# Patient Record
Sex: MALE | Race: WHITE | NOT HISPANIC OR LATINO | Employment: OTHER | URBAN - METROPOLITAN AREA
[De-identification: names, ages, dates, MRNs, and addresses within clinical notes are randomized per-mention and may not be internally consistent; named-entity substitution may affect disease eponyms.]

---

## 2017-02-22 ENCOUNTER — NON-PROVIDER VISIT (OUTPATIENT)
Dept: CARDIOLOGY | Facility: MEDICAL CENTER | Age: 82
End: 2017-02-22
Payer: MEDICARE

## 2017-02-22 DIAGNOSIS — I49.5 SSS (SICK SINUS SYNDROME) (HCC): Chronic | ICD-10-CM

## 2017-02-22 DIAGNOSIS — Z95.0 PRESENCE OF PERMANENT CARDIAC PACEMAKER: Chronic | ICD-10-CM

## 2017-02-22 PROCEDURE — 93280 PM DEVICE PROGR EVAL DUAL: CPT | Performed by: INTERNAL MEDICINE

## 2017-05-23 ENCOUNTER — PATIENT OUTREACH (OUTPATIENT)
Dept: HEALTH INFORMATION MANAGEMENT | Facility: OTHER | Age: 82
End: 2017-05-23

## 2017-05-23 NOTE — PROGRESS NOTES
Attempt #:1 -- APPOINTMENT SCHEDULED WITH PT'S SON. HE WILL NOTIFY SENIOR HELPERS OF THIS APPOINTMENT.    WebIZ Checked & Epic Updated: yes  HealthConnect Verified: no  Verify PCP: yes    Communication Preference Obtained: yes     Review Care Team: yes    Annual Wellness Visit Scheduling  1. Scheduling Status:Scheduled    Care Gap Scheduling      Health Maintenance Due   Topic Date Due   • Annual Wellness Visit  SCHEDULED   • IMM DTaP/Tdap/Td Vaccine (1 - Tdap) WILL DISCUSS WITH PCP   • COLONOSCOPY  WILL DISCUSS WITH PCP   • IMM ZOSTER VACCINE  WILL DISCUSS WITH PCP   • IMM PNEUMOCOCCAL 65+ (ADULT) LOW/MEDIUM RISK SERIES (1 of 2 - PCV13) WILL DISCUSS WITH PCP         MyChart Activation: NOT DISCUSSED WITH PT'S SON DURING THIS PHONE CALL.  [employer] Tiff: no  Virtual Visits: no  Opt In to Text Messages: no

## 2017-06-12 ENCOUNTER — OFFICE VISIT (OUTPATIENT)
Dept: MEDICAL GROUP | Facility: MEDICAL CENTER | Age: 82
End: 2017-06-12
Payer: MEDICARE

## 2017-06-12 VITALS
HEIGHT: 66 IN | WEIGHT: 159.83 LBS | TEMPERATURE: 98 F | OXYGEN SATURATION: 97 % | SYSTOLIC BLOOD PRESSURE: 128 MMHG | DIASTOLIC BLOOD PRESSURE: 72 MMHG | BODY MASS INDEX: 25.69 KG/M2 | HEART RATE: 72 BPM

## 2017-06-12 DIAGNOSIS — K21.9 GASTROESOPHAGEAL REFLUX DISEASE WITHOUT ESOPHAGITIS: Chronic | ICD-10-CM

## 2017-06-12 DIAGNOSIS — Z85.048 HISTORY OF RECTAL CANCER: Chronic | ICD-10-CM

## 2017-06-12 DIAGNOSIS — E05.00 GRAVES' DISEASE: Chronic | ICD-10-CM

## 2017-06-12 DIAGNOSIS — I49.5 SSS (SICK SINUS SYNDROME) (HCC): Chronic | ICD-10-CM

## 2017-06-12 DIAGNOSIS — I10 BENIGN ESSENTIAL HYPERTENSION: Chronic | ICD-10-CM

## 2017-06-12 DIAGNOSIS — Z00.00 MEDICARE ANNUAL WELLNESS VISIT, INITIAL: Primary | ICD-10-CM

## 2017-06-12 DIAGNOSIS — Z95.0 PRESENCE OF PERMANENT CARDIAC PACEMAKER: Chronic | ICD-10-CM

## 2017-06-12 PROCEDURE — G0438 PPPS, INITIAL VISIT: HCPCS | Performed by: NURSE PRACTITIONER

## 2017-06-12 RX ORDER — OMEPRAZOLE 20 MG/1
20 CAPSULE, DELAYED RELEASE ORAL DAILY
Status: ON HOLD | COMMUNITY
End: 2018-03-03

## 2017-06-12 ASSESSMENT — PATIENT HEALTH QUESTIONNAIRE - PHQ9: CLINICAL INTERPRETATION OF PHQ2 SCORE: 0

## 2017-06-12 NOTE — ASSESSMENT & PLAN NOTE
Chronic condition managed with current medical regimen  Stable per review   Continue with current meds  Followed by Sandra Kwon M.D..

## 2017-06-12 NOTE — PROGRESS NOTES
CC:   Medicare Annual Wellness Visit    HPI:  Richard is a 89 y.o. here for Medicare Annual Wellness Visit    Patient Active Problem List    Diagnosis Date Noted   • Aortic stenosis 05/07/2013     Priority: High   • Benign essential hypertension 02/13/2012     Priority: High   • Presence of permanent cardiac pacemaker 02/13/2012     Priority: High   • SSS (sick sinus syndrome) (CMS-HCC) 02/13/2012     Priority: High   • Hypothyroidism 03/27/2014     Priority: Medium   • Esophageal reflux 02/13/2012     Priority: Medium   • History of Graves' disease 02/13/2012     Priority: Medium   • History of rectal cancer 02/13/2012     Priority: Medium   • Benign prostatic hypertrophy 02/13/2012     Priority: Low     Current Outpatient Prescriptions   Medication Sig Dispense Refill   • omeprazole (PRILOSEC) 20 MG delayed-release capsule Take 20 mg by mouth every day.     • losartan-hydrochlorothiazide (HYZAAR) 100-12.5 MG per tablet Take 1 Tab by mouth every day.     • finasteride (PROSCAR) 5 MG TABS Take 1 Tab by mouth every day.     • allopurinol (ZYLOPRIM) 300 MG TABS Take 1 Tab by mouth every day.     • Cholecalciferol (RA VITAMIN D-3) 5000 UNIT CAPS Take  by mouth.     • aspirin (ASA) 81 MG CHEW Take 1 Tab by mouth every day. 30 Each 11   • levothyroxine (SYNTHROID) 175 MCG TABS Take 175 mcg by mouth every day.       No current facility-administered medications for this visit.      Current supplements: no  Chronic narcotic pain medicines: no  Allergies: Penicillins  Exercise: as chelly,   Current social contact/activities: lives alone, son lives in [place], caregiver daily, caregiver here with Richard    Screening:  Depression Screening    Little interest or pleasure in doing things?  0 - not at all  Feeling down, depressed , or hopeless? 0 - not at all  Trouble falling or staying asleep, or sleeping too much?     Feeling tired or having little energy?     Poor appetite or overeating?     Feeling bad about yourself - or that you are  a failure or have let yourself or your family down?    Trouble concentrating on things, such as reading the newspaper or watching television?    Moving or speaking so slowly that other people could have noticed.  Or the opposite - being so fidgety or restless that you have been moving around a lot more than usual?     Thoughts that you would be better off dead, or of hurting yourself?     Patient Health Questionnaire Score:    If depressive symptoms identified deferred to follow up visit unless specifically addressed in assessment and plan.    Interpretation of PHQ-9 Total Score   Score Severity   1-4 Minimal Depression   5-9 Mild Depression   10-14 Moderate Depression   15-19 Moderately Severe Depression   20-27 Severe Depression    Screening for Cognitive Impairment    Three Minute Recall (banana, sunrise, fence)  1/3    Draw clock face with all 12 numbers set to the hand to show 10 minures past 11 o'clock  1    If cognitive concerns identified deferred to follow up visit unless specifically addressed in assessment and plan.    Fall Risk Assessment    Has the patient had two or more falls in the last year or any fall with injury in the last year?  No  If Fall Risk identified deferred to follow up visit unless specifically addressed in assessment and plan.    Safety Assessment    Throw rugs on floor.  No  Handrails on all stairs.  Yes  Good lighting in all hallways.  Yes  Difficulty hearing.  Yes  Patient counseled about all safety risks that were identified.    Functional Assessment ADLs    Are there any barriers preventing you from cooking for yourself or meeting nutritional needs?  Yes.    Are there any barriers preventing you from driving safely or obtaining transportation?  Yes.    Are there any barriers preventing you from using a telephone or calling for help?  No.    Are there any barriers preventing you from shopping?  Yes.    Are there any barriers preventing you from taking care of your own finances?   "Yes.    Are there any barriers preventing you from managing your medications?  Yes.    Are currently engaing any exercise or physical activity?  No.       Health Maintenance Summary                Annual Wellness Visit Overdue 6/17/1921     IMM DTaP/Tdap/Td Vaccine Overdue 6/17/1940     IMM ZOSTER VACCINE Overdue 6/17/1981     IMM PNEUMOCOCCAL 65+ (ADULT) LOW/MEDIUM RISK SERIES Overdue 6/17/1986           Patient Care Team:  Sandra Kwon M.D. as PCP - General  Kathi Bush M.D. as Consulting Physician (Cardiology)        Social History   Substance Use Topics   • Smoking status: Former Smoker -- 20 years     Types: Cigarettes     Quit date: 11/20/1982   • Smokeless tobacco: Never Used   • Alcohol Use: Yes      Comment: occas scotch       Family History   Problem Relation Age of Onset   • Heart Disease Mother      Reported Family Hx of Ischemic Heart Disease   • Heart Disease Father      Reported Family Hx of Ischemic Heart Disease     He  has a past medical history of Benign essential hypertension ( ); Benign prostatic hypertrophy ( ); Presence of permanent cardiac pacemaker ( ); Esophageal reflux ( ); SSS (sick sinus syndrome) (CMS-[facility]) (July 2009); Grave's disease ( ); History of rectal cancer ( ); Aortic stenosis; and Hypothyroidism. He also has no past medical history of Encounter for long-term (current) use of other medications.   Past Surgical History   Procedure Laterality Date   • Other abdominal surgery       Colostomy   • Pacemaker insertion  July 2009     [employer] Altrua 60 implanted by Dr. Griffin.       ROS:    Ostomy or other tubes or amputations: yes, colostomy  Chronic oxygen use no, prn use at home  Last eye exam 2016  : Denies incontinence.   Gait: Uses a cane   Hearing good.    Dentition good    Exam: Blood pressure 128/72, pulse 72, temperature 36.7 °C (98 °F), height 1.676 m (5' 6\"), weight 72.5 kg (159 lb 13.3 oz), SpO2 97 %. Body mass index is 25.81 kg/(m^2).  Alert, " oriented in no acute distress.  Eye contact is good, speech goal directed, affect calm      Assessment and Plan. The following treatment and monitoring plan is recommended for all problems as listed below:   Aortic stenosis  Followed by cardiology q 6 months  Denies cardiac sxs  Continue with current medications     Benign essential hypertension  Chronic condition managed with current medical regimen  Stable per review   Continue with current meds  Followed by Sandra Kwon M.D..        Benign prostatic hypertrophy  Chronic condition managed with current medical regimen  Stable per review   Continue with current meds  Followed by Sandra Kwon M.D..        Esophageal reflux  Chronic condition managed with current medical regimen  Stable per review   Continue with current meds  Followed by Sandra Kwon M.D..        History of Graves' disease  Chronic condition managed with current medical regimen  Stable per review   Continue with current meds  Followed by Sandra Kwon M.D..        History of rectal cancer  Per pt and caregiver has a colostomy since dx of rectal cancer  To best of his recollection, no chemo or radiation   Followed by Sandra Kwon M.D..     Hypothyroidism  Chronic condition managed with current medical regimen  Stable per review   Continue with current meds  Followed by Sandra Kwon M.D..        Presence of permanent cardiac pacemaker  Followed by cardiology q 6 months  Denies cardiac sxs  Continue with current medications     SSS (sick sinus syndrome)  Pacemaker in place  Followed by cardiology        Health Care Screening recommendations reviewed with patient today: per Patient Instructions  DPA/Advanced directive: .unknown    Next office visit for recheck of chronic medical conditions is due with Sandra Kwon M.D. in 6 months

## 2017-06-12 NOTE — ASSESSMENT & PLAN NOTE
Per pt and caregiver has a colostomy since dx of rectal cancer  To best of his recollection, no chemo or radiation   Followed by Sandra Kwon M.D..

## 2017-06-12 NOTE — PATIENT INSTRUCTIONS
Continue with care through Sandra Kwon M.D..  Next Medicare Annual Wellness Visit is due in one year.    Continue care with specialists you are seeing for your chronic problems.    Attached is some preventative information for you to read.          Fall Prevention and Home Safety  Falls cause injuries and can affect all age groups. It is possible to prevent falls.   HOW TO PREVENT FALLS  · Wear shoes with rubber soles that do not have an opening for your toes.   · Keep the inside and outside of your house well lit.   · Use night lights throughout your home.   · Remove clutter from floors.   · Clean up floor spills.   · Remove throw rugs or fasten them to the floor with carpet tape.   · Do not place electrical cords across pathways.   · Put grab bars by your tub, shower, and toilet. Do not use towel bars as grab bars.   · Put handrails on both sides of the stairway. Fix loose handrails.   · Do not climb on stools or stepladders, if possible.   · Do not wax your floors.   · Repair uneven or unsafe sidewalks, walkways, or stairs.   · Keep items you use a lot within reach.   · Be aware of pets.   · Keep emergency numbers next to the telephone.   · Put smoke detectors in your home and near bedrooms.   Ask your doctor what other things you can do to prevent falls.  Document Released: 10/14/2010 Document Revised: 06/18/2013 Document Reviewed: 03/19/2013  ExitCare® Patient Information ©2013 "[employer].    Exercise to Stay Healthy      Exercise helps you become and stay healthy.  EXERCISE IDEAS AND TIPS  Choose exercises that:  · You enjoy.   · Fit into your day.   You do not need to exercise really hard to be healthy. You can do exercises at a slow or medium level and stay healthy. You can:  · Stretch before and after working out.   · Try yoga, Pilates, or sha chi.   · Lift weights.   · Walk fast, swim, jog, run, climb stairs, bicycle, dance, or rollerskate.   · Take aerobic classes.   Exercises that burn  about 150 calories:  · Running 1 ½ miles in 15 minutes.   · Playing volleyball for 45 to 60 minutes.   · Washing and waxing a car for 45 to 60 minutes.   · Playing touch football for 45 minutes.   · Walking 1 ¾ miles in 35 minutes.   · Pushing a stroller 1 ½ miles in 30 minutes.   · Playing basketball for 30 minutes.   · Raking leaves for 30 minutes.   · Bicycling 5 miles in 30 minutes.   · Walking 2 miles in 30 minutes.   · Dancing for 30 minutes.   · Shoveling snow for 15 minutes.   · Swimming laps for 20 minutes.   · Walking up stairs for 15 minutes.   · Bicycling 4 miles in 15 minutes.   · Gardening for 30 to 45 minutes.   · Jumping rope for 15 minutes.   · Washing windows or floors for 45 to 60 minutes.     One suggestion is to start walking for 10 minutes after each meal.  This will help with digestion and help you to metabolize your meal.       For Weight Loss -   Recommend portion sizes with more fruits/vegetables/high fiber foods.  Stay away from white bread/pastas/white rice/white potatoes.  Increase Fluid intake to 6-8 glasses of water daily.   Eliminate soda's (diet and regular) from your fluid intake.      Document Released: 01/20/2012 Document Revised: 03/11/2013 Document Reviewed: 01/20/2012  ExitCare® Patient Information ©2013 [employer]    Fat and Cholesterol Control Diet  Cholesterol is a wax-like substance. It comes from your liver and is found in certain foods. There is good (HDL) and bad (LDL) cholesterol. Too much cholesterol in your blood can affect your heart. Certain foods can lower or raise your cholesterol. Eat foods that are low in cholesterol.  Saturated and trans fats are bad fats found in foods that will raise your cholesterol. Do not eat foods that are high in saturated and trans fats.  FOODS HIGHER IN SATURATED AND TRANS FATS  · Dairy products, such as whole milk, eggs, cheese, cream, and butter.   · Fatty meats, such as hot dogs, sausage, and salami.   · Fried foods.   · Trans  fats which are found in margarine and pre-made cookies, crackers, and baked goods.   · Tropical oils, such as coconut and palm oils.   Read package labels at the store. Do not buy products that use saturated or trans fats or hydrogenated oils. Find foods labeled:  · Low-fat.   · Low-saturated fat.   · Trans-fat-free.   · Low-cholesterol.   FOODS LOWER IN CHOLESTEROL  ·  Fruit.   · Vegetables.   · Beans, peas, and lentils.   · Fish.   · Lean meat, such as chicken (without skin) or ground turkey.   · Grains, such as barley, rice, couscous, bulgur wheat, and pasta.   · Heart-healthy tub margarine.   PREPARING YOUR FOOD  · Broil, bake, steam, or roast foods. Do not austin food.   · Use non-stick cooking sprays.   · Use lemon or herbs to flavor food instead of using butter or stick margarine.   · Use nonfat yogurt, salsa, or low-fat dressings for salads.   Document Released: 06/18/2013 Document Reviewed: 06/18/2013  ExitCare® Patient Information ©2013 [employer]    Recommend annual flu vaccine.  Next due in Fall, 2015.  If you decide not to have the flu vaccine, recommend good handwashing and staying out of crowds during flu season.

## 2017-06-12 NOTE — PROGRESS NOTES
"Per caregiver present, received both the flu shot and \" a pneumonia shot\" in the fall of 2016.  Will bring record into the next Sandra Kwon M.D. appt for documentation purposes.  "

## 2018-01-23 ENCOUNTER — NON-PROVIDER VISIT (OUTPATIENT)
Dept: CARDIOLOGY | Facility: MEDICAL CENTER | Age: 83
End: 2018-01-23
Payer: MEDICARE

## 2018-01-23 ENCOUNTER — OFFICE VISIT (OUTPATIENT)
Dept: CARDIOLOGY | Facility: MEDICAL CENTER | Age: 83
End: 2018-01-23
Payer: MEDICARE

## 2018-01-23 VITALS
HEART RATE: 68 BPM | DIASTOLIC BLOOD PRESSURE: 80 MMHG | WEIGHT: 160 LBS | SYSTOLIC BLOOD PRESSURE: 140 MMHG | BODY MASS INDEX: 25.82 KG/M2 | OXYGEN SATURATION: 98 %

## 2018-01-23 DIAGNOSIS — Z95.0 PRESENCE OF PERMANENT CARDIAC PACEMAKER: Chronic | ICD-10-CM

## 2018-01-23 DIAGNOSIS — I35.0 NONRHEUMATIC AORTIC VALVE STENOSIS: ICD-10-CM

## 2018-01-23 DIAGNOSIS — I10 BENIGN ESSENTIAL HYPERTENSION: Chronic | ICD-10-CM

## 2018-01-23 PROCEDURE — 93280 PM DEVICE PROGR EVAL DUAL: CPT | Performed by: INTERNAL MEDICINE

## 2018-01-23 PROCEDURE — 99214 OFFICE O/P EST MOD 30 MIN: CPT | Performed by: INTERNAL MEDICINE

## 2018-01-23 NOTE — LETTER
[facility]   [address]  [place] [address]  Phone: [phone]  Fax: [phone]              Richard Callahan  6/17/1921    Encounter Date: 1/23/2018    Kathi Bush M.D.          PROGRESS NOTE:  Subjective:   Richard Callahan is a 89 y.o. male who presents today in follow-up regards to his aortic stenosis and pacemaker for sick sinus syndrome    He is with his caregiver as usual, they are doing very well  No hospital stays  Quality-of-life is excellent offers no complaints  As usually uses a cane, no falling    Past Medical History:   Diagnosis Date   • Aortic stenosis    • Benign essential hypertension     • Benign prostatic hypertrophy     • Esophageal reflux     • Grave's disease     • History of rectal cancer     • Hypothyroidism    • Presence of permanent cardiac pacemaker     • SSS (sick sinus syndrome) (CMS-[facility]) July 2009    Status post PPM implantation.     Past Surgical History:   Procedure Laterality Date   • PACEMAKER INSERTION  July 2009    [employer] Altrua 60 implanted by Dr. Griffin.   • OTHER ABDOMINAL SURGERY      Colostomy     Family History   Problem Relation Age of Onset   • Heart Disease Mother      Reported Family Hx of Ischemic Heart Disease   • Heart Disease Father      Reported Family Hx of Ischemic Heart Disease     History   Smoking Status   • Former Smoker   • Years: 20.00   • Types: Cigarettes   • Quit date: 11/20/1982   Smokeless Tobacco   • Never Used     Allergies   Allergen Reactions   • Penicillins      Outpatient Encounter Prescriptions as of 1/23/2018   Medication Sig Dispense Refill   • omeprazole (PRILOSEC) 20 MG delayed-release capsule Take 20 mg by mouth every day.     • losartan-hydrochlorothiazide (HYZAAR) 100-12.5 MG per tablet Take 1 Tab by mouth every day.     • finasteride (PROSCAR) 5 MG TABS Take 1 Tab by mouth every day.     • allopurinol (ZYLOPRIM) 300 MG TABS Take 1 Tab by mouth  every day.     • Cholecalciferol (RA VITAMIN D-3) 5000 UNIT CAPS Take  by mouth.     • aspirin (ASA) 81 MG CHEW Take 1 Tab by mouth every day. 30 Each 11   • levothyroxine (SYNTHROID) 175 MCG TABS Take 175 mcg by mouth every day.       No facility-administered encounter medications on file as of 1/23/2018.      Review of Systems   Unable to perform ROS: mental acuity        Objective:   /80   Pulse 68   Wt 72.6 kg (160 lb)   SpO2 98%   BMI 25.82 kg/m²      Physical Exam   Constitutional: He is oriented to person, place, and time. He appears well-developed.   Younger than stated age   HENT:   Head: Normocephalic and atraumatic.   Mouth/Throat: Mucous membranes are normal.   Eyes: Conjunctivae and EOM are normal.   Neck: No JVD present. No thyroid mass present.   Cardiovascular: Normal rate, regular rhythm and intact distal pulses.    Murmur (2/6 systolic ejection) heard.  Pacemaker in left chest   Pulmonary/Chest: Effort normal and breath sounds normal. He exhibits no tenderness.   Abdominal: Bowel sounds are normal.   Musculoskeletal: Normal range of motion. He exhibits no edema or tenderness.   Neurological: He is alert and oriented to person, place, and time. He has normal strength. He displays no tremor.   Skin: Skin is warm and dry. No rash noted.   Psychiatric: He has a normal mood and affect. His behavior is normal.   Vitals reviewed.      Assessment:     1. Nonrheumatic aortic valve stenosis     2. Presence of permanent cardiac pacemaker     3. Benign essential hypertension         Medical Decision Making:  Today's Assessment / Status / Plan:     Pacemaker for sick sinus syndrome   Pacemaker reviewed normal function and no changes made   He does follow-up twice every year for his pacer.   He does not have to see me with each visit especially if he is doing so well.   His memory is quite poor and his functionality is good although he is getting more breathless. Risk for falling as high, even if  fibrillation where not planning on systemic and coagulation    End-of-life care - aortic stenosis  As always, reassurance   His aortic stenosis murmur has increased, he is DNR and we will keep him palliative care as best we can for the rest of his life    RTC one year to see me and 6 months for his pacer.       Sandra Kwon M.D.  [address] [place] [id number]  VIA Facsimile: [phone]

## 2018-01-23 NOTE — PROGRESS NOTES
Subjective:   Richard Callahan is a 89 y.o. male who presents today in follow-up regards to his aortic stenosis and pacemaker for sick sinus syndrome    He is with his caregiver as usual, they are doing very well  No hospital stays  Quality-of-life is excellent offers no complaints  As usually uses a cane, no falling    Past Medical History:   Diagnosis Date   • Aortic stenosis    • Benign essential hypertension     • Benign prostatic hypertrophy     • Esophageal reflux     • Grave's disease     • History of rectal cancer     • Hypothyroidism    • Presence of permanent cardiac pacemaker     • SSS (sick sinus syndrome) (CMS-HCC) July 2009    Status post PPM implantation.     Past Surgical History:   Procedure Laterality Date   • PACEMAKER INSERTION  July 2009    [employer] Altrua 60 implanted by Dr. Griffin.   • OTHER ABDOMINAL SURGERY      Colostomy     Family History   Problem Relation Age of Onset   • Heart Disease Mother      Reported Family Hx of Ischemic Heart Disease   • Heart Disease Father      Reported Family Hx of Ischemic Heart Disease     History   Smoking Status   • Former Smoker   • Years: 20.00   • Types: Cigarettes   • Quit date: 11/20/1982   Smokeless Tobacco   • Never Used     Allergies   Allergen Reactions   • Penicillins      Outpatient Encounter Prescriptions as of 1/23/2018   Medication Sig Dispense Refill   • omeprazole (PRILOSEC) 20 MG delayed-release capsule Take 20 mg by mouth every day.     • losartan-hydrochlorothiazide (HYZAAR) 100-12.5 MG per tablet Take 1 Tab by mouth every day.     • finasteride (PROSCAR) 5 MG TABS Take 1 Tab by mouth every day.     • allopurinol (ZYLOPRIM) 300 MG TABS Take 1 Tab by mouth every day.     • Cholecalciferol (RA VITAMIN D-3) 5000 UNIT CAPS Take  by mouth.     • aspirin (ASA) 81 MG CHEW Take 1 Tab by mouth every day. 30 Each 11   • levothyroxine (SYNTHROID) 175 MCG TABS Take 175 mcg by mouth every day.       No facility-administered encounter  medications on file as of 1/23/2018.      Review of Systems   Unable to perform ROS: mental acuity        Objective:   /80   Pulse 68   Wt 72.6 kg (160 lb)   SpO2 98%   BMI 25.82 kg/m²     Physical Exam   Constitutional: He is oriented to person, place, and time. He appears well-developed.   Younger than stated age   HENT:   Head: Normocephalic and atraumatic.   Mouth/Throat: Mucous membranes are normal.   Eyes: Conjunctivae and EOM are normal.   Neck: No JVD present. No thyroid mass present.   Cardiovascular: Normal rate, regular rhythm and intact distal pulses.    Murmur (2/6 systolic ejection) heard.  Pacemaker in left chest   Pulmonary/Chest: Effort normal and breath sounds normal. He exhibits no tenderness.   Abdominal: Bowel sounds are normal.   Musculoskeletal: Normal range of motion. He exhibits no edema or tenderness.   Neurological: He is alert and oriented to person, place, and time. He has normal strength. He displays no tremor.   Skin: Skin is warm and dry. No rash noted.   Psychiatric: He has a normal mood and affect. His behavior is normal.   Vitals reviewed.      Assessment:     1. Nonrheumatic aortic valve stenosis     2. Presence of permanent cardiac pacemaker     3. Benign essential hypertension         Medical Decision Making:  Today's Assessment / Status / Plan:     Pacemaker for sick sinus syndrome   Pacemaker reviewed normal function and no changes made   He does follow-up twice every year for his pacer.   He does not have to see me with each visit especially if he is doing so well.   His memory is quite poor and his functionality is good although he is getting more breathless. Risk for falling as high, even if fibrillation where not planning on systemic and coagulation    End-of-life care - aortic stenosis  As always, reassurance   His aortic stenosis murmur has increased, he is DNR and we will keep him palliative care as best we can for the rest of his life    RTC one year to see  me and 6 months for his pacer.

## 2018-02-19 ENCOUNTER — APPOINTMENT (OUTPATIENT)
Dept: RADIOLOGY | Facility: IMAGING CENTER | Age: 83
End: 2018-02-19
Attending: PHYSICIAN ASSISTANT
Payer: MEDICARE

## 2018-02-19 ENCOUNTER — OFFICE VISIT (OUTPATIENT)
Dept: URGENT CARE | Facility: CLINIC | Age: 83
End: 2018-02-19
Payer: MEDICARE

## 2018-02-19 VITALS
SYSTOLIC BLOOD PRESSURE: 138 MMHG | BODY MASS INDEX: 26.82 KG/M2 | WEIGHT: 161 LBS | OXYGEN SATURATION: 96 % | HEART RATE: 88 BPM | TEMPERATURE: 96.8 F | HEIGHT: 65 IN | DIASTOLIC BLOOD PRESSURE: 80 MMHG

## 2018-02-19 DIAGNOSIS — J06.9 UPPER RESPIRATORY TRACT INFECTION, UNSPECIFIED TYPE: ICD-10-CM

## 2018-02-19 DIAGNOSIS — R05.9 COUGH: ICD-10-CM

## 2018-02-19 PROCEDURE — 71046 X-RAY EXAM CHEST 2 VIEWS: CPT | Mod: TC | Performed by: PHYSICIAN ASSISTANT

## 2018-02-19 PROCEDURE — 99214 OFFICE O/P EST MOD 30 MIN: CPT | Performed by: PHYSICIAN ASSISTANT

## 2018-02-19 RX ORDER — BENZONATATE 100 MG/1
200 CAPSULE ORAL 3 TIMES DAILY PRN
Qty: 30 CAP | Refills: 0 | Status: ON HOLD | OUTPATIENT
Start: 2018-02-19 | End: 2018-03-03

## 2018-02-19 ASSESSMENT — ENCOUNTER SYMPTOMS
EYE REDNESS: 0
EYE DISCHARGE: 0
NECK PAIN: 0
TINGLING: 0
RHINORRHEA: 1
DIZZINESS: 0
SORE THROAT: 0
FALLS: 0
MYALGIAS: 0
VOMITING: 0
SPUTUM PRODUCTION: 1
FEVER: 0
DIARRHEA: 0
CHILLS: 1
SHORTNESS OF BREATH: 0
WHEEZING: 0
COUGH: 1
HEADACHES: 0

## 2018-02-20 NOTE — PROGRESS NOTES
"Subjective:      Richard Callahan is a 89 y.o. male who presents with Cough (\"dry raspy cough,chest congestion\"x2days )            Patient is a 89-year-old male who presents today with cough, congestion for the last 2-3 days. Patient is with his caregiver today who provides the majority of the history. Patient's caregiver is concerned about pneumonia at this time as patient's cough was \"really bad\" earlier.      Cough   This is a new problem. Episode onset: 2-3 days ago. The problem has been unchanged. The problem occurs every few minutes. The cough is non-productive. Associated symptoms include chills, nasal congestion and rhinorrhea. Pertinent negatives include no ear pain, eye redness, fever, headaches, myalgias, rash, sore throat, shortness of breath or wheezing. Nothing aggravates the symptoms. He has tried nothing for the symptoms. The treatment provided mild relief.       Review of Systems   Constitutional: Positive for chills. Negative for fever and malaise/fatigue.   HENT: Positive for congestion and rhinorrhea. Negative for ear discharge, ear pain and sore throat.    Eyes: Negative for discharge and redness.   Respiratory: Positive for cough and sputum production. Negative for shortness of breath and wheezing.    Cardiovascular: Negative for leg swelling.   Gastrointestinal: Negative for diarrhea and vomiting.        Noted colostomy- without loose stool     Musculoskeletal: Negative for falls, myalgias and neck pain.   Skin: Negative for itching and rash.   Neurological: Negative for dizziness, tingling and headaches.   All other systems reviewed and are negative.         Objective:     /80   Pulse 88   Temp 36 °C (96.8 °F)   Ht 1.651 m (5' 5\")   Wt 73 kg (161 lb)   SpO2 96%   BMI 26.79 kg/m²    PMH:  has a past medical history of Aortic stenosis; Benign essential hypertension ( ); Benign prostatic hypertrophy ( ); Esophageal reflux ( ); Grave's disease ( ); History of rectal cancer ( ); " Hypothyroidism; Presence of permanent cardiac pacemaker ( ); and SSS (sick sinus syndrome) (CMS-HCC) (July 2009). He also has no past medical history of Encounter for long-term (current) use of other medications.  MEDS:   Current Outpatient Prescriptions:   •  benzonatate (TESSALON) 100 MG Cap, Take 2 Caps by mouth 3 times a day as needed for Cough., Disp: 30 Cap, Rfl: 0  •  omeprazole (PRILOSEC) 20 MG delayed-release capsule, Take 20 mg by mouth every day., Disp: , Rfl:   •  losartan-hydrochlorothiazide (HYZAAR) 100-12.5 MG per tablet, Take 1 Tab by mouth every day., Disp: , Rfl:   •  finasteride (PROSCAR) 5 MG TABS, Take 1 Tab by mouth every day., Disp: , Rfl:   •  allopurinol (ZYLOPRIM) 300 MG TABS, Take 1 Tab by mouth every day., Disp: , Rfl:   •  Cholecalciferol (RA VITAMIN D-3) 5000 UNIT CAPS, Take  by mouth., Disp: , Rfl:   •  aspirin (ASA) 81 MG CHEW, Take 1 Tab by mouth every day., Disp: 30 Each, Rfl: 11  •  levothyroxine (SYNTHROID) 175 MCG TABS, Take 175 mcg by mouth every day., Disp: , Rfl:   ALLERGIES:   Allergies   Allergen Reactions   • Penicillins      SURGHX:   Past Surgical History:   Procedure Laterality Date   • PACEMAKER INSERTION  July 2009    [employer] Altrua 60 implanted by Dr. Griffin.   • OTHER ABDOMINAL SURGERY      Colostomy     SOCHX:  reports that he quit smoking about 35 years ago. His smoking use included Cigarettes. He quit after 20.00 years of use. He has never used smokeless tobacco. He reports that he drinks alcohol. He reports that he does not use drugs.  FH: Family history was reviewed, no pertinent findings to report    Physical Exam   Constitutional: He is oriented to person, place, and time. He appears well-developed and well-nourished.   HENT:   Head: Normocephalic and atraumatic.   Mouth/Throat: No oropharyngeal exudate.   Ears- Canals clear- TM- with clear fluid effusions bilaterally.   Pos. PND, with slight erythema- without tonsillar edema or exudate.   Mild  discharge noted bilaterally- to nares.      Eyes: EOM are normal. Pupils are equal, round, and reactive to light.   Neck: Normal range of motion. Neck supple.   Cardiovascular: Normal rate and regular rhythm.    Murmur heard.  Pulmonary/Chest: Effort normal and breath sounds normal. No respiratory distress. He has no wheezes.   Musculoskeletal: Normal range of motion. He exhibits no edema or tenderness.   Lymphadenopathy:     He has no cervical adenopathy.   Neurological: He is alert and oriented to person, place, and time.   Skin: Skin is warm. No rash noted.   Vitals reviewed.           XR:   No acute cardiopulmonary process is identified.  Prominent atherosclerotic plaque.  Assessment/Plan:     1. Upper respiratory tract infection, unspecified type  - benzonatate (TESSALON) 100 MG Cap; Take 2 Caps by mouth 3 times a day as needed for Cough.  Dispense: 30 Cap; Refill: 0    2. Cough  - DX-CHEST-2 VIEWS; Future  - benzonatate (TESSALON) 100 MG Cap; Take 2 Caps by mouth 3 times a day as needed for Cough.  Dispense: 30 Cap; Refill: 0    It was explained to the pt. Today that due to the viral nature of the pt's illness, we will treat symptomatically today. Encouraged OTC supportive meds PRN. Humidification, increase fluids, avoid night time dairy.   Patient given precautionary s/sx that mandate immediate follow up and evaluation in the ED. Advised of risks of not doing so.    DDX, Supportive care, and indications for immediate follow-up discussed with patient.    Instructed to return to clinic or nearest emergency department if we are not available for any change in condition, further concerns, or worsening of symptoms.    The patient demonstrated a good understanding and agreed with the treatment plan.

## 2018-02-24 ENCOUNTER — APPOINTMENT (OUTPATIENT)
Dept: RADIOLOGY | Facility: MEDICAL CENTER | Age: 83
DRG: 189 | End: 2018-02-24
Attending: INTERNAL MEDICINE
Payer: MEDICARE

## 2018-02-24 ENCOUNTER — RESOLUTE PROFESSIONAL BILLING HOSPITAL PROF FEE (OUTPATIENT)
Dept: HOSPITALIST | Facility: MEDICAL CENTER | Age: 83
End: 2018-02-24
Payer: MEDICARE

## 2018-02-24 ENCOUNTER — APPOINTMENT (OUTPATIENT)
Dept: RADIOLOGY | Facility: MEDICAL CENTER | Age: 83
DRG: 189 | End: 2018-02-24
Attending: EMERGENCY MEDICINE
Payer: MEDICARE

## 2018-02-24 ENCOUNTER — HOSPITAL ENCOUNTER (INPATIENT)
Facility: MEDICAL CENTER | Age: 83
LOS: 12 days | DRG: 189 | End: 2018-03-08
Attending: EMERGENCY MEDICINE | Admitting: INTERNAL MEDICINE
Payer: MEDICARE

## 2018-02-24 DIAGNOSIS — J40 BRONCHITIS: ICD-10-CM

## 2018-02-24 DIAGNOSIS — I49.5 SSS (SICK SINUS SYNDROME) (HCC): ICD-10-CM

## 2018-02-24 DIAGNOSIS — J20.9 ACUTE BRONCHITIS, UNSPECIFIED ORGANISM: ICD-10-CM

## 2018-02-24 DIAGNOSIS — A41.9 SEPSIS, DUE TO UNSPECIFIED ORGANISM: ICD-10-CM

## 2018-02-24 DIAGNOSIS — Z85.048 HISTORY OF RECTAL CANCER: Chronic | ICD-10-CM

## 2018-02-24 DIAGNOSIS — F03.90 DEMENTIA WITHOUT BEHAVIORAL DISTURBANCE, UNSPECIFIED DEMENTIA TYPE: ICD-10-CM

## 2018-02-24 PROBLEM — E87.20 LACTIC ACIDOSIS: Status: ACTIVE | Noted: 2018-02-24

## 2018-02-24 PROBLEM — I10 HTN (HYPERTENSION): Status: ACTIVE | Noted: 2018-02-24

## 2018-02-24 PROBLEM — J45.909 REACTIVE AIRWAY DISEASE: Status: ACTIVE | Noted: 2018-02-24

## 2018-02-24 PROBLEM — M10.9 GOUT: Status: ACTIVE | Noted: 2018-02-24

## 2018-02-24 PROBLEM — D72.829 LEUKOCYTOSIS: Status: ACTIVE | Noted: 2018-02-24

## 2018-02-24 PROBLEM — J96.01 ACUTE HYPOXEMIC RESPIRATORY FAILURE (HCC): Status: ACTIVE | Noted: 2018-02-24

## 2018-02-24 PROBLEM — N18.30 CKD (CHRONIC KIDNEY DISEASE), STAGE III: Status: ACTIVE | Noted: 2018-02-24

## 2018-02-24 PROBLEM — Z66 DNR (DO NOT RESUSCITATE): Status: ACTIVE | Noted: 2018-02-24

## 2018-02-24 LAB
ALBUMIN SERPL BCP-MCNC: 4 G/DL (ref 3.2–4.9)
ALBUMIN/GLOB SERPL: 1.2 G/DL
ALP SERPL-CCNC: 42 U/L (ref 30–99)
ALT SERPL-CCNC: 9 U/L (ref 2–50)
ANION GAP SERPL CALC-SCNC: 12 MMOL/L (ref 0–11.9)
APTT PPP: 31.7 SEC (ref 24.7–36)
AST SERPL-CCNC: 27 U/L (ref 12–45)
BASOPHILS # BLD AUTO: 0.2 % (ref 0–1.8)
BASOPHILS # BLD: 0.02 K/UL (ref 0–0.12)
BILIRUB SERPL-MCNC: 1.5 MG/DL (ref 0.1–1.5)
BNP SERPL-MCNC: 212 PG/ML (ref 0–100)
BUN SERPL-MCNC: 30 MG/DL (ref 8–22)
CALCIUM SERPL-MCNC: 9.6 MG/DL (ref 8.5–10.5)
CHLORIDE SERPL-SCNC: 102 MMOL/L (ref 96–112)
CO2 SERPL-SCNC: 21 MMOL/L (ref 20–33)
CREAT SERPL-MCNC: 1.46 MG/DL (ref 0.5–1.4)
CRP SERPL HS-MCNC: 13.59 MG/DL (ref 0–0.75)
EOSINOPHIL # BLD AUTO: 0.02 K/UL (ref 0–0.51)
EOSINOPHIL NFR BLD: 0.2 % (ref 0–6.9)
ERYTHROCYTE [DISTWIDTH] IN BLOOD BY AUTOMATED COUNT: 44.3 FL (ref 35.9–50)
ERYTHROCYTE [SEDIMENTATION RATE] IN BLOOD BY WESTERGREN METHOD: 43 MM/HOUR (ref 0–20)
FLUAV RNA SPEC QL NAA+PROBE: NEGATIVE
FLUBV RNA SPEC QL NAA+PROBE: NEGATIVE
GLOBULIN SER CALC-MCNC: 3.4 G/DL (ref 1.9–3.5)
GLUCOSE SERPL-MCNC: 145 MG/DL (ref 65–99)
HCT VFR BLD AUTO: 40.4 % (ref 42–52)
HGB BLD-MCNC: 14.2 G/DL (ref 14–18)
IMM GRANULOCYTES # BLD AUTO: 0.07 K/UL (ref 0–0.11)
IMM GRANULOCYTES NFR BLD AUTO: 0.6 % (ref 0–0.9)
INR PPP: 1.13 (ref 0.87–1.13)
LACTATE BLD-SCNC: 2.1 MMOL/L (ref 0.5–2)
LACTATE BLD-SCNC: 2.3 MMOL/L (ref 0.5–2)
LACTATE BLD-SCNC: 4 MMOL/L (ref 0.5–2)
LYMPHOCYTES # BLD AUTO: 0.86 K/UL (ref 1–4.8)
LYMPHOCYTES NFR BLD: 7.8 % (ref 22–41)
MAGNESIUM SERPL-MCNC: 1.8 MG/DL (ref 1.5–2.5)
MCH RBC QN AUTO: 31.2 PG (ref 27–33)
MCHC RBC AUTO-ENTMCNC: 35.1 G/DL (ref 33.7–35.3)
MCV RBC AUTO: 88.8 FL (ref 81.4–97.8)
MONOCYTES # BLD AUTO: 1.55 K/UL (ref 0–0.85)
MONOCYTES NFR BLD AUTO: 14.1 % (ref 0–13.4)
NEUTROPHILS # BLD AUTO: 8.49 K/UL (ref 1.82–7.42)
NEUTROPHILS NFR BLD: 77.1 % (ref 44–72)
NRBC # BLD AUTO: 0 K/UL
NRBC BLD-RTO: 0 /100 WBC
PHOSPHATE SERPL-MCNC: 3.5 MG/DL (ref 2.5–4.5)
PLATELET # BLD AUTO: 201 K/UL (ref 164–446)
PMV BLD AUTO: 9.5 FL (ref 9–12.9)
POTASSIUM SERPL-SCNC: 3.8 MMOL/L (ref 3.6–5.5)
PROCALCITONIN SERPL-MCNC: 0.16 NG/ML
PROT SERPL-MCNC: 7.4 G/DL (ref 6–8.2)
PROTHROMBIN TIME: 14.2 SEC (ref 12–14.6)
RBC # BLD AUTO: 4.55 M/UL (ref 4.7–6.1)
SODIUM SERPL-SCNC: 135 MMOL/L (ref 135–145)
TSH SERPL DL<=0.005 MIU/L-ACNC: 0.82 UIU/ML (ref 0.38–5.33)
WBC # BLD AUTO: 11 K/UL (ref 4.8–10.8)

## 2018-02-24 PROCEDURE — 96374 THER/PROPH/DIAG INJ IV PUSH: CPT

## 2018-02-24 PROCEDURE — 87040 BLOOD CULTURE FOR BACTERIA: CPT | Mod: 91

## 2018-02-24 PROCEDURE — 99223 1ST HOSP IP/OBS HIGH 75: CPT | Mod: AI | Performed by: INTERNAL MEDICINE

## 2018-02-24 PROCEDURE — 700111 HCHG RX REV CODE 636 W/ 250 OVERRIDE (IP): Performed by: INTERNAL MEDICINE

## 2018-02-24 PROCEDURE — 93010 ELECTROCARDIOGRAM REPORT: CPT | Performed by: INTERNAL MEDICINE

## 2018-02-24 PROCEDURE — 80053 COMPREHEN METABOLIC PANEL: CPT

## 2018-02-24 PROCEDURE — 84443 ASSAY THYROID STIM HORMONE: CPT

## 2018-02-24 PROCEDURE — 83880 ASSAY OF NATRIURETIC PEPTIDE: CPT

## 2018-02-24 PROCEDURE — A9270 NON-COVERED ITEM OR SERVICE: HCPCS | Performed by: EMERGENCY MEDICINE

## 2018-02-24 PROCEDURE — 700101 HCHG RX REV CODE 250: Performed by: EMERGENCY MEDICINE

## 2018-02-24 PROCEDURE — 36415 COLL VENOUS BLD VENIPUNCTURE: CPT

## 2018-02-24 PROCEDURE — 700102 HCHG RX REV CODE 250 W/ 637 OVERRIDE(OP): Performed by: EMERGENCY MEDICINE

## 2018-02-24 PROCEDURE — 84100 ASSAY OF PHOSPHORUS: CPT

## 2018-02-24 PROCEDURE — 96375 TX/PRO/DX INJ NEW DRUG ADDON: CPT

## 2018-02-24 PROCEDURE — 83605 ASSAY OF LACTIC ACID: CPT

## 2018-02-24 PROCEDURE — 93005 ELECTROCARDIOGRAM TRACING: CPT | Performed by: INTERNAL MEDICINE

## 2018-02-24 PROCEDURE — A9270 NON-COVERED ITEM OR SERVICE: HCPCS | Performed by: INTERNAL MEDICINE

## 2018-02-24 PROCEDURE — 71045 X-RAY EXAM CHEST 1 VIEW: CPT

## 2018-02-24 PROCEDURE — 85730 THROMBOPLASTIN TIME PARTIAL: CPT

## 2018-02-24 PROCEDURE — 94640 AIRWAY INHALATION TREATMENT: CPT

## 2018-02-24 PROCEDURE — 83735 ASSAY OF MAGNESIUM: CPT

## 2018-02-24 PROCEDURE — 86140 C-REACTIVE PROTEIN: CPT

## 2018-02-24 PROCEDURE — 700105 HCHG RX REV CODE 258: Performed by: INTERNAL MEDICINE

## 2018-02-24 PROCEDURE — 700111 HCHG RX REV CODE 636 W/ 250 OVERRIDE (IP): Performed by: EMERGENCY MEDICINE

## 2018-02-24 PROCEDURE — 99285 EMERGENCY DEPT VISIT HI MDM: CPT

## 2018-02-24 PROCEDURE — 85025 COMPLETE CBC W/AUTO DIFF WBC: CPT

## 2018-02-24 PROCEDURE — 700102 HCHG RX REV CODE 250 W/ 637 OVERRIDE(OP): Performed by: INTERNAL MEDICINE

## 2018-02-24 PROCEDURE — 700101 HCHG RX REV CODE 250: Performed by: INTERNAL MEDICINE

## 2018-02-24 PROCEDURE — 84145 PROCALCITONIN (PCT): CPT

## 2018-02-24 PROCEDURE — 85610 PROTHROMBIN TIME: CPT

## 2018-02-24 PROCEDURE — 85652 RBC SED RATE AUTOMATED: CPT

## 2018-02-24 PROCEDURE — 87502 INFLUENZA DNA AMP PROBE: CPT

## 2018-02-24 PROCEDURE — 770006 HCHG ROOM/CARE - MED/SURG/GYN SEMI*

## 2018-02-24 PROCEDURE — 93306 TTE W/DOPPLER COMPLETE: CPT | Mod: 26 | Performed by: INTERNAL MEDICINE

## 2018-02-24 RX ORDER — SODIUM CHLORIDE 9 MG/ML
250 INJECTION, SOLUTION INTRAVENOUS ONCE
Status: COMPLETED | OUTPATIENT
Start: 2018-02-24 | End: 2018-02-24

## 2018-02-24 RX ORDER — ONDANSETRON 4 MG/1
4 TABLET, ORALLY DISINTEGRATING ORAL EVERY 4 HOURS PRN
Status: DISCONTINUED | OUTPATIENT
Start: 2018-02-24 | End: 2018-03-08 | Stop reason: HOSPADM

## 2018-02-24 RX ORDER — MAGNESIUM SULFATE 1 G/100ML
1 INJECTION INTRAVENOUS ONCE
Status: COMPLETED | OUTPATIENT
Start: 2018-02-24 | End: 2018-02-24

## 2018-02-24 RX ORDER — PREDNISONE 50 MG/1
50 TABLET ORAL DAILY
Status: DISCONTINUED | OUTPATIENT
Start: 2018-02-25 | End: 2018-02-27

## 2018-02-24 RX ORDER — ALLOPURINOL 100 MG/1
100 TABLET ORAL DAILY
Status: ON HOLD | COMMUNITY
End: 2018-03-03

## 2018-02-24 RX ORDER — ERGOCALCIFEROL 1.25 MG/1
50000 CAPSULE ORAL
Status: ON HOLD | COMMUNITY
End: 2018-03-03

## 2018-02-24 RX ORDER — IPRATROPIUM BROMIDE AND ALBUTEROL SULFATE 2.5; .5 MG/3ML; MG/3ML
3 SOLUTION RESPIRATORY (INHALATION)
Status: DISCONTINUED | OUTPATIENT
Start: 2018-02-24 | End: 2018-02-25

## 2018-02-24 RX ORDER — AZITHROMYCIN 250 MG/1
500 TABLET, FILM COATED ORAL ONCE
Status: COMPLETED | OUTPATIENT
Start: 2018-02-24 | End: 2018-02-24

## 2018-02-24 RX ORDER — HYDROCHLOROTHIAZIDE 25 MG/1
12.5 TABLET ORAL
Status: DISCONTINUED | OUTPATIENT
Start: 2018-02-25 | End: 2018-02-24

## 2018-02-24 RX ORDER — SODIUM CHLORIDE 9 MG/ML
1000 INJECTION, SOLUTION INTRAVENOUS ONCE
Status: DISCONTINUED | OUTPATIENT
Start: 2018-02-24 | End: 2018-02-24

## 2018-02-24 RX ORDER — HEPARIN SODIUM 5000 [USP'U]/ML
5000 INJECTION, SOLUTION INTRAVENOUS; SUBCUTANEOUS EVERY 12 HOURS
Status: DISCONTINUED | OUTPATIENT
Start: 2018-02-24 | End: 2018-03-08 | Stop reason: HOSPADM

## 2018-02-24 RX ORDER — SODIUM CHLORIDE 9 MG/ML
INJECTION, SOLUTION INTRAVENOUS CONTINUOUS
Status: ACTIVE | OUTPATIENT
Start: 2018-02-24 | End: 2018-02-25

## 2018-02-24 RX ORDER — CETIRIZINE HYDROCHLORIDE 10 MG/1
10 TABLET ORAL DAILY
Status: DISCONTINUED | OUTPATIENT
Start: 2018-02-25 | End: 2018-03-08 | Stop reason: HOSPADM

## 2018-02-24 RX ORDER — ERGOCALCIFEROL 1.25 MG/1
50000 CAPSULE ORAL
Status: DISCONTINUED | OUTPATIENT
Start: 2018-02-26 | End: 2018-03-08 | Stop reason: HOSPADM

## 2018-02-24 RX ORDER — DOXYCYCLINE 100 MG/1
100 TABLET ORAL EVERY 12 HOURS
Status: COMPLETED | OUTPATIENT
Start: 2018-02-24 | End: 2018-03-02

## 2018-02-24 RX ORDER — CEFTRIAXONE 1 G/1
1 INJECTION, POWDER, FOR SOLUTION INTRAMUSCULAR; INTRAVENOUS ONCE
Status: COMPLETED | OUTPATIENT
Start: 2018-02-24 | End: 2018-02-24

## 2018-02-24 RX ORDER — FINASTERIDE 5 MG/1
5 TABLET, FILM COATED ORAL DAILY
Status: DISCONTINUED | OUTPATIENT
Start: 2018-02-25 | End: 2018-03-08 | Stop reason: HOSPADM

## 2018-02-24 RX ORDER — LEVOTHYROXINE SODIUM 0.15 MG/1
150 TABLET ORAL
Status: DISCONTINUED | OUTPATIENT
Start: 2018-02-25 | End: 2018-03-08 | Stop reason: HOSPADM

## 2018-02-24 RX ORDER — POTASSIUM CHLORIDE 20 MEQ/1
40 TABLET, EXTENDED RELEASE ORAL ONCE
Status: COMPLETED | OUTPATIENT
Start: 2018-02-24 | End: 2018-02-24

## 2018-02-24 RX ORDER — LABETALOL HYDROCHLORIDE 5 MG/ML
10 INJECTION, SOLUTION INTRAVENOUS EVERY 4 HOURS PRN
Status: DISCONTINUED | OUTPATIENT
Start: 2018-02-24 | End: 2018-03-08 | Stop reason: HOSPADM

## 2018-02-24 RX ORDER — SODIUM CHLORIDE 9 MG/ML
250 INJECTION, SOLUTION INTRAVENOUS
Status: DISCONTINUED | OUTPATIENT
Start: 2018-02-24 | End: 2018-03-08 | Stop reason: HOSPADM

## 2018-02-24 RX ORDER — LEVOTHYROXINE SODIUM 0.15 MG/1
150 TABLET ORAL
Status: ON HOLD | COMMUNITY
End: 2018-03-03

## 2018-02-24 RX ORDER — METHYLPREDNISOLONE SODIUM SUCCINATE 125 MG/2ML
62.5 INJECTION, POWDER, LYOPHILIZED, FOR SOLUTION INTRAMUSCULAR; INTRAVENOUS ONCE
Status: COMPLETED | OUTPATIENT
Start: 2018-02-24 | End: 2018-02-24

## 2018-02-24 RX ORDER — IPRATROPIUM BROMIDE AND ALBUTEROL SULFATE 2.5; .5 MG/3ML; MG/3ML
3 SOLUTION RESPIRATORY (INHALATION)
Status: COMPLETED | OUTPATIENT
Start: 2018-02-24 | End: 2018-02-24

## 2018-02-24 RX ORDER — METRONIDAZOLE 500 MG/1
500 TABLET ORAL EVERY 8 HOURS
Status: COMPLETED | OUTPATIENT
Start: 2018-02-24 | End: 2018-03-02

## 2018-02-24 RX ORDER — AZITHROMYCIN 500 MG/1
500 INJECTION, POWDER, LYOPHILIZED, FOR SOLUTION INTRAVENOUS ONCE
Status: DISCONTINUED | OUTPATIENT
Start: 2018-02-24 | End: 2018-02-24

## 2018-02-24 RX ORDER — ACETAMINOPHEN 325 MG/1
650 TABLET ORAL EVERY 6 HOURS PRN
Status: DISCONTINUED | OUTPATIENT
Start: 2018-02-24 | End: 2018-03-08 | Stop reason: HOSPADM

## 2018-02-24 RX ORDER — LOSARTAN POTASSIUM AND HYDROCHLOROTHIAZIDE 12.5; 1 MG/1; MG/1
1 TABLET ORAL DAILY
Status: DISCONTINUED | OUTPATIENT
Start: 2018-02-24 | End: 2018-02-24

## 2018-02-24 RX ORDER — LOSARTAN POTASSIUM 50 MG/1
100 TABLET ORAL
Status: DISCONTINUED | OUTPATIENT
Start: 2018-02-25 | End: 2018-02-27

## 2018-02-24 RX ORDER — ASPIRIN 81 MG/1
81 TABLET, CHEWABLE ORAL DAILY
Status: DISCONTINUED | OUTPATIENT
Start: 2018-02-25 | End: 2018-03-08 | Stop reason: HOSPADM

## 2018-02-24 RX ORDER — ONDANSETRON 2 MG/ML
4 INJECTION INTRAMUSCULAR; INTRAVENOUS EVERY 4 HOURS PRN
Status: DISCONTINUED | OUTPATIENT
Start: 2018-02-24 | End: 2018-03-08 | Stop reason: HOSPADM

## 2018-02-24 RX ORDER — ALLOPURINOL 100 MG/1
100 TABLET ORAL DAILY
Status: DISCONTINUED | OUTPATIENT
Start: 2018-02-25 | End: 2018-03-08 | Stop reason: HOSPADM

## 2018-02-24 RX ORDER — MAGNESIUM SULFATE HEPTAHYDRATE 40 MG/ML
2 INJECTION, SOLUTION INTRAVENOUS ONCE
Status: COMPLETED | OUTPATIENT
Start: 2018-02-24 | End: 2018-02-25

## 2018-02-24 RX ADMIN — IPRATROPIUM BROMIDE AND ALBUTEROL SULFATE 3 ML: .5; 3 SOLUTION RESPIRATORY (INHALATION) at 13:40

## 2018-02-24 RX ADMIN — MAGNESIUM SULFATE IN DEXTROSE 1 G: 10 INJECTION, SOLUTION INTRAVENOUS at 22:19

## 2018-02-24 RX ADMIN — POTASSIUM CHLORIDE 40 MEQ: 1500 TABLET, EXTENDED RELEASE ORAL at 19:20

## 2018-02-24 RX ADMIN — AZITHROMYCIN 500 MG: 250 TABLET, FILM COATED ORAL at 14:56

## 2018-02-24 RX ADMIN — DOXYCYCLINE 100 MG: 100 TABLET ORAL at 22:20

## 2018-02-24 RX ADMIN — HEPARIN SODIUM 5000 UNITS: 5000 INJECTION, SOLUTION INTRAVENOUS; SUBCUTANEOUS at 22:19

## 2018-02-24 RX ADMIN — IPRATROPIUM BROMIDE AND ALBUTEROL SULFATE 3 ML: .5; 3 SOLUTION RESPIRATORY (INHALATION) at 16:45

## 2018-02-24 RX ADMIN — METRONIDAZOLE 500 MG: 500 TABLET ORAL at 22:20

## 2018-02-24 RX ADMIN — SODIUM CHLORIDE 250 ML: 9 INJECTION, SOLUTION INTRAVENOUS at 19:21

## 2018-02-24 RX ADMIN — CEFTRIAXONE SODIUM 1 G: 1 INJECTION, POWDER, FOR SOLUTION INTRAMUSCULAR; INTRAVENOUS at 14:55

## 2018-02-24 RX ADMIN — METHYLPREDNISOLONE SODIUM SUCCINATE 62.5 MG: 125 INJECTION, POWDER, FOR SOLUTION INTRAMUSCULAR; INTRAVENOUS at 19:20

## 2018-02-24 RX ADMIN — MAGNESIUM SULFATE IN WATER 2 G: 40 INJECTION, SOLUTION INTRAVENOUS at 23:54

## 2018-02-24 RX ADMIN — IPRATROPIUM BROMIDE AND ALBUTEROL SULFATE 3 ML: .5; 3 SOLUTION RESPIRATORY (INHALATION) at 23:52

## 2018-02-24 RX ADMIN — SODIUM CHLORIDE: 9 INJECTION, SOLUTION INTRAVENOUS at 20:00

## 2018-02-24 ASSESSMENT — ENCOUNTER SYMPTOMS
FEVER: 0
NECK PAIN: 0
WEIGHT LOSS: 0
BLOOD IN STOOL: 0
PHOTOPHOBIA: 0
DIAPHORESIS: 0
VOMITING: 0
PND: 0
CLAUDICATION: 0
SINUS PAIN: 0
FALLS: 0
EYE PAIN: 0
ORTHOPNEA: 0
COUGH: 1
STRIDOR: 0
EYE DISCHARGE: 0
SHORTNESS OF BREATH: 1
WEAKNESS: 1
MYALGIAS: 0
FLANK PAIN: 0
DIZZINESS: 0
ABDOMINAL PAIN: 0
HEARTBURN: 0
DIARRHEA: 0
NAUSEA: 0
HEADACHES: 0
EYE REDNESS: 0
CONSTIPATION: 0
PALPITATIONS: 0
BACK PAIN: 0
WHEEZING: 1
HEMOPTYSIS: 0
SORE THROAT: 0
CHILLS: 0
SPUTUM PRODUCTION: 0
BLURRED VISION: 0
DOUBLE VISION: 0
DEPRESSION: 0

## 2018-02-24 ASSESSMENT — COGNITIVE AND FUNCTIONAL STATUS - GENERAL
DRESSING REGULAR LOWER BODY CLOTHING: A LITTLE
EATING MEALS: A LITTLE
DAILY ACTIVITIY SCORE: 17
TOILETING: A LITTLE
SUGGESTED CMS G CODE MODIFIER DAILY ACTIVITY: CK
SUGGESTED CMS G CODE MODIFIER MOBILITY: CJ
STANDING UP FROM CHAIR USING ARMS: A LITTLE
HELP NEEDED FOR BATHING: A LOT
PERSONAL GROOMING: A LITTLE
MOBILITY SCORE: 20
TURNING FROM BACK TO SIDE WHILE IN FLAT BAD: A LITTLE
WALKING IN HOSPITAL ROOM: A LITTLE
DRESSING REGULAR UPPER BODY CLOTHING: A LITTLE
CLIMB 3 TO 5 STEPS WITH RAILING: A LITTLE

## 2018-02-24 ASSESSMENT — COPD QUESTIONNAIRES
COPD SCREENING SCORE: 5
DO YOU EVER COUGH UP ANY MUCUS OR PHLEGM?: NO/ONLY WITH OCCASIONAL COLDS OR INFECTIONS
DURING THE PAST 4 WEEKS HOW MUCH DID YOU FEEL SHORT OF BREATH: SOME OF THE TIME
HAVE YOU SMOKED AT LEAST 100 CIGARETTES IN YOUR ENTIRE LIFE: YES

## 2018-02-24 ASSESSMENT — LIFESTYLE VARIABLES: EVER_SMOKED: YES

## 2018-02-24 ASSESSMENT — PAIN SCALES - GENERAL
PAINLEVEL_OUTOF10: 0
PAINLEVEL_OUTOF10: 0

## 2018-02-24 NOTE — ED PROVIDER NOTES
ED Provider Note    Scribed for Prosper Kline M.D. by Lamont Mcneal. 2/24/2018  1:26 PM    Primary care provider: Sandra Kwon M.D.  Means of arrival: Ambulance  History obtained from: Patient, caregiver.   History limited by: Confusion    CHIEF COMPLAINT  Chief Complaint   Patient presents with   • Cold Symptoms     cough worsening x 1 week, increasing weakness       HPI  Richard Callahan is a 89 y.o. male who presents to the Emergency Department after being brought in by ambulance for evaluation of worsened cough onset a week ago. Per caregiver, the patient has been experiencing cold-like symptoms including cough and rhinorrhea and was seen at Urgent Care earlier this week for symptoms. His xray was overall normal and the patient was discharged home with a cough suppressant. Caregiver states symptoms have gradually worsened today. Patient denies any chest pain, abdominal pain, nausea, or vomiting. The patient has history of cardiac pacemaker with normal HR between . HPI limited secondary to dementia.     REVIEW OF SYSTEMS  Pertinent positives include cold-like symptoms including cough and rhinorrhea. Pertinent negatives include no chest pain, abdominal pain, nausea, or vomiting. ROS limited secondary to dementia.  C.     PAST MEDICAL HISTORY   has a past medical history of Aortic stenosis; Benign essential hypertension ( ); Benign prostatic hypertrophy ( ); Esophageal reflux ( ); Grave's disease ( ); History of rectal cancer ( ); Hypothyroidism; Presence of permanent cardiac pacemaker ( ); and SSS (sick sinus syndrome) (CMS-[facility]) (July 2009).    SURGICAL HISTORY   has a past surgical history that includes other abdominal surgery and pacemaker insertion (July 2009).    SOCIAL HISTORY  Social History   Substance Use Topics   • Smoking status: Former Smoker     Years: 20.00     Types: Cigarettes     Quit date: 11/20/1982   • Smokeless tobacco: Never Used   • Alcohol use Yes      Comment: occas  scotch      History   Drug Use No       FAMILY HISTORY  Family History   Problem Relation Age of Onset   • Heart Disease Mother      Reported Family Hx of Ischemic Heart Disease   • Heart Disease Father      Reported Family Hx of Ischemic Heart Disease       CURRENT MEDICATIONS  Home Medications     Reviewed by Kelly Napoles R.N. (Registered Nurse) on 02/24/18 at 1318  Med List Status: Not Addressed   Medication Last Dose Status   allopurinol (ZYLOPRIM) 300 MG TABS 2/19/2018 Active   aspirin (ASA) 81 MG CHEW 2/19/2018 Active   benzonatate (TESSALON) 100 MG Cap  Active   Cholecalciferol (RA VITAMIN D-3) 5000 UNIT CAPS 2/19/2018 Active   finasteride (PROSCAR) 5 MG TABS 2/19/2018 Active   levothyroxine (SYNTHROID) 175 MCG TABS 2/19/2018 Active   losartan-hydrochlorothiazide (HYZAAR) 100-12.5 MG per tablet 2/19/2018 Active   omeprazole (PRILOSEC) 20 MG delayed-release capsule 2/19/2018 Active                ALLERGIES  Allergies   Allergen Reactions   • Penicillins        PHYSICAL EXAM  VITAL SIGNS: /63   Pulse 91   Temp 36.7 °C (98.1 °F)   Resp 20   Wt 76.6 kg (168 lb 14 oz)   SpO2 98%   BMI 28.10 kg/m²     Constitutional: Well developed, Well nourished, moderate respiratory distress, Non-toxic appearance.   HENT: Normocephalic, Atraumatic, Bilateral external ears normal, Oropharynx with posterior nasal drainage, No oral exudates.   Eyes: PERRLA, EOMI, Conjunctiva normal, No discharge.   Neck: No tenderness, Supple, No stridor.   Lymphatic: No lymphadenopathy noted.   Cardiovascular: Normal heart rate, Normal rhythm.   Thorax & Lungs: Diffuse inspiratory and expiratory wheezing. No crackles.   Abdomen: Soft, No tenderness, No masses, No pulsatile masses.   Skin: Warm, Dry, No erythema, No rash.   Extremities:, No edema No cyanosis.   Musculoskeletal: No tenderness to palpation or major deformities noted.  Intact distal pulses  Neurologic: Awake, alert. Moves all extremities spontaneously.  Psychiatric:  "Affect normal, Judgment normal, Mood normal.     LABS  Labs Reviewed   LACTIC ACID - Abnormal; Notable for the following:        Result Value    Lactic Acid 2.3 (*)     All other components within normal limits   LACTIC ACID - Abnormal; Notable for the following:     Lactic Acid 2.1 (*)     All other components within normal limits   CBC WITH DIFFERENTIAL - Abnormal; Notable for the following:     WBC 11.0 (*)     RBC 4.55 (*)     Hematocrit 40.4 (*)     Neutrophils-Polys 77.10 (*)     Lymphocytes 7.80 (*)     Monocytes 14.10 (*)     Neutrophils (Absolute) 8.49 (*)     Lymphs (Absolute) 0.86 (*)     Monos (Absolute) 1.55 (*)     All other components within normal limits   COMP METABOLIC PANEL - Abnormal; Notable for the following:     Anion Gap 12.0 (*)     Glucose 145 (*)     Bun 30 (*)     Creatinine 1.46 (*)     All other components within normal limits   ESTIMATED GFR - Abnormal; Notable for the following:     GFR If  54 (*)     GFR If Non  45 (*)     All other components within normal limits   URINE CULTURE(NEW)    Narrative:     Indication for culture:->Emergency Room Patient   BLOOD CULTURE    Narrative:     Per Hospital Policy: Only change Specimen Src: to \"Line\" if  specified by physician order.   BLOOD CULTURE    Narrative:     Per Hospital Policy: Only change Specimen Src: to \"Line\" if  specified by physician order.   INFLUENZA A/B BY PCR    Narrative:     Indication for culture:->Emergency Room Patient   URINALYSIS   All labs reviewed by me.    RADIOLOGY  DX-CHEST-PORTABLE (1 VIEW)   Final Result      1.  Mild bibasilar interstitial densities likely related to atelectasis.      The radiologist's interpretation of all radiological studies have been reviewed by me.    COURSE & MEDICAL DECISION MAKING  Pertinent Labs & Imaging studies reviewed. (See chart for details)        1:26 PM - Patient seen and examined at bedside. Patient will be treated with Duoneb. Ordered DX-chest, " lactic acid, influenza by PCR, CBC, CMP, urinalysis, urine culture, and blood culture x2 to evaluate his symptoms. The differential diagnoses include but are not limited to: influenza, bronchitis, PNA, sepsis. Informed the patient that he will be admitted to the ED for further medical treatment and care, which he understands and agrees with.    Decision Making:  Is with cough, trouble breathing, diffuse wheezing throughout, give the patient a breathing treatment, chest x-ray does not show any pneumonia, just some atelectasis, the patient is hypoxic, tachypneic, septic, give the patient IV antibiotics, discussed the case with the hospitalist for admission to hospital.    DISPOSITION:  Patient will be admitted to Dr. key in guarded condition.    FINAL IMPRESSION  1. Sepsis, due to unspecified organism (CMS-[facility])    2. Bronchitis         Lamont RICHARDS (Scribe), am scribing for, and in the presence of, Prosper Kline M.D..    Electronically signed by: Lamont Mcneal (Carmela), 2/24/2018    IProsper M.D. personally performed the services described in this documentation, as scribed by Lamont Mcneal in my presence, and it is both accurate and complete.    The note accurately reflects work and decisions made by me.  Prosper Kline  2/24/2018  3:47 PM

## 2018-02-24 NOTE — FLOWSHEET NOTE
02/24/18 1341   Events/Summary/Plan   Events/Summary/Plan svn   Interdisciplinary Plan of Care-Goals (Indications)   Obstructive Ventilatory Defect or Pulmonary Disease without Obvious Obstruction Physical Exam / Hyperinflation / Wheezing (bronchospasm)   Interdisciplinary Plan of Care-Outcomes    Bronchodilator Outcome Patient at Stable Baseline   Education   Education Yes - Pt. / Family has been Instructed in use of Respiratory Equipment   RT Assessment of Delivered Medications   Evaluation of Medication Delivery Daily Yes-- Pt /Family has been Instructed in use of Respiratory Medications and Adverse Reactions   SVN Group   #SVN Performed Yes   Given By: Mask   Date SVN Last Changed 02/24/18   Date SVN Next Change Due (Q 7 Days) 03/03/18   Respiratory WDL   Respiratory (WDL) X   Chest Exam   Work Of Breathing / Effort Mild;Shallow   Respiration 18   Pulse (!) 103   Breath Sounds   Pre/Post Intervention Pre Intervention Assessment   RUL Breath Sounds Coarse Crackles   RML Breath Sounds Coarse Crackles   RLL Breath Sounds Rhonchi   ETHAN Breath Sounds Coarse Crackles   LLL Breath Sounds Rhonchi   Secretions   Cough Congested;Moist;Non Productive   Sputum Amount Unable to Evaluate   Oximetry   Continuous Oximetry Yes   Oxygen   Pulse Oximetry 98 %   O2 (LPM) 2   O2 Daily Delivery Respiratory  Nasal Cannula

## 2018-02-24 NOTE — ED TRIAGE NOTES
"Richard Otoolebritt Callahan  89 y.o.  Chief Complaint   Patient presents with   • Cold Symptoms     cough worsening x 1 week, increasing weakness     Pt was BIB [facility]. PT's caregiver called as she thinks his condition has worsened since she saw him last week. He was at the  earlier in the week, CXR \"looked ok\", was given a prescription for cough suppressant at that time. Pt has h/o dementia, is disoriented to time, otherwise oriented and cooperative. Denies pain  "

## 2018-02-25 ENCOUNTER — APPOINTMENT (OUTPATIENT)
Dept: RADIOLOGY | Facility: MEDICAL CENTER | Age: 83
DRG: 189 | End: 2018-02-25
Attending: INTERNAL MEDICINE
Payer: MEDICARE

## 2018-02-25 PROBLEM — N18.30 CKD (CHRONIC KIDNEY DISEASE), STAGE III: Chronic | Status: ACTIVE | Noted: 2018-02-24

## 2018-02-25 PROBLEM — M10.9 GOUT: Chronic | Status: ACTIVE | Noted: 2018-02-24

## 2018-02-25 LAB
ALBUMIN SERPL BCP-MCNC: 3.7 G/DL (ref 3.2–4.9)
ALBUMIN/GLOB SERPL: 1.2 G/DL
ALP SERPL-CCNC: 43 U/L (ref 30–99)
ALT SERPL-CCNC: 9 U/L (ref 2–50)
ANION GAP SERPL CALC-SCNC: 14 MMOL/L (ref 0–11.9)
APPEARANCE UR: CLEAR
AST SERPL-CCNC: 22 U/L (ref 12–45)
BACTERIA #/AREA URNS HPF: NEGATIVE /HPF
BASOPHILS # BLD AUTO: 0.1 % (ref 0–1.8)
BASOPHILS # BLD: 0.01 K/UL (ref 0–0.12)
BILIRUB SERPL-MCNC: 0.7 MG/DL (ref 0.1–1.5)
BILIRUB UR QL STRIP.AUTO: NEGATIVE
BNP SERPL-MCNC: 126 PG/ML (ref 0–100)
BUN SERPL-MCNC: 36 MG/DL (ref 8–22)
CALCIUM SERPL-MCNC: 9.1 MG/DL (ref 8.5–10.5)
CHLORIDE SERPL-SCNC: 102 MMOL/L (ref 96–112)
CO2 SERPL-SCNC: 19 MMOL/L (ref 20–33)
COLOR UR: YELLOW
CREAT SERPL-MCNC: 1.7 MG/DL (ref 0.5–1.4)
EKG IMPRESSION: NORMAL
EOSINOPHIL # BLD AUTO: 0 K/UL (ref 0–0.51)
EOSINOPHIL NFR BLD: 0 % (ref 0–6.9)
EPI CELLS #/AREA URNS HPF: ABNORMAL /HPF
ERYTHROCYTE [DISTWIDTH] IN BLOOD BY AUTOMATED COUNT: 46.5 FL (ref 35.9–50)
GLOBULIN SER CALC-MCNC: 3.2 G/DL (ref 1.9–3.5)
GLUCOSE SERPL-MCNC: 238 MG/DL (ref 65–99)
GLUCOSE UR STRIP.AUTO-MCNC: NEGATIVE MG/DL
HCT VFR BLD AUTO: 39.9 % (ref 42–52)
HGB BLD-MCNC: 13.2 G/DL (ref 14–18)
HYALINE CASTS #/AREA URNS LPF: ABNORMAL /LPF
IMM GRANULOCYTES # BLD AUTO: 0.14 K/UL (ref 0–0.11)
IMM GRANULOCYTES NFR BLD AUTO: 1.4 % (ref 0–0.9)
KETONES UR STRIP.AUTO-MCNC: NEGATIVE MG/DL
LEUKOCYTE ESTERASE UR QL STRIP.AUTO: NEGATIVE
LYMPHOCYTES # BLD AUTO: 0.49 K/UL (ref 1–4.8)
LYMPHOCYTES NFR BLD: 4.9 % (ref 22–41)
MAGNESIUM SERPL-MCNC: 2.6 MG/DL (ref 1.5–2.5)
MCH RBC QN AUTO: 30.5 PG (ref 27–33)
MCHC RBC AUTO-ENTMCNC: 33.1 G/DL (ref 33.7–35.3)
MCV RBC AUTO: 92.1 FL (ref 81.4–97.8)
MICRO URNS: ABNORMAL
MONOCYTES # BLD AUTO: 0.23 K/UL (ref 0–0.85)
MONOCYTES NFR BLD AUTO: 2.3 % (ref 0–13.4)
NEUTROPHILS # BLD AUTO: 9.06 K/UL (ref 1.82–7.42)
NEUTROPHILS NFR BLD: 91.3 % (ref 44–72)
NITRITE UR QL STRIP.AUTO: NEGATIVE
NRBC # BLD AUTO: 0 K/UL
NRBC BLD-RTO: 0 /100 WBC
PH UR STRIP.AUTO: 5 [PH]
PHOSPHATE SERPL-MCNC: 3.8 MG/DL (ref 2.5–4.5)
PLATELET # BLD AUTO: 185 K/UL (ref 164–446)
PMV BLD AUTO: 9.7 FL (ref 9–12.9)
POTASSIUM SERPL-SCNC: 4.2 MMOL/L (ref 3.6–5.5)
PROT SERPL-MCNC: 6.9 G/DL (ref 6–8.2)
PROT UR QL STRIP: 100 MG/DL
RBC # BLD AUTO: 4.33 M/UL (ref 4.7–6.1)
RBC # URNS HPF: ABNORMAL /HPF
RBC UR QL AUTO: NEGATIVE
SODIUM SERPL-SCNC: 135 MMOL/L (ref 135–145)
SP GR UR STRIP.AUTO: 1.02
UROBILINOGEN UR STRIP.AUTO-MCNC: 0.2 MG/DL
WBC # BLD AUTO: 9.9 K/UL (ref 4.8–10.8)
WBC #/AREA URNS HPF: ABNORMAL /HPF

## 2018-02-25 PROCEDURE — 94640 AIRWAY INHALATION TREATMENT: CPT

## 2018-02-25 PROCEDURE — 71045 X-RAY EXAM CHEST 1 VIEW: CPT

## 2018-02-25 PROCEDURE — 770006 HCHG ROOM/CARE - MED/SURG/GYN SEMI*

## 2018-02-25 PROCEDURE — 94760 N-INVAS EAR/PLS OXIMETRY 1: CPT

## 2018-02-25 PROCEDURE — 85025 COMPLETE CBC W/AUTO DIFF WBC: CPT

## 2018-02-25 PROCEDURE — 80053 COMPREHEN METABOLIC PANEL: CPT

## 2018-02-25 PROCEDURE — 81001 URINALYSIS AUTO W/SCOPE: CPT

## 2018-02-25 PROCEDURE — A9270 NON-COVERED ITEM OR SERVICE: HCPCS | Performed by: INTERNAL MEDICINE

## 2018-02-25 PROCEDURE — 700111 HCHG RX REV CODE 636 W/ 250 OVERRIDE (IP): Performed by: INTERNAL MEDICINE

## 2018-02-25 PROCEDURE — 36415 COLL VENOUS BLD VENIPUNCTURE: CPT

## 2018-02-25 PROCEDURE — 83880 ASSAY OF NATRIURETIC PEPTIDE: CPT

## 2018-02-25 PROCEDURE — 700102 HCHG RX REV CODE 250 W/ 637 OVERRIDE(OP): Performed by: INTERNAL MEDICINE

## 2018-02-25 PROCEDURE — 700101 HCHG RX REV CODE 250: Performed by: INTERNAL MEDICINE

## 2018-02-25 PROCEDURE — 99232 SBSQ HOSP IP/OBS MODERATE 35: CPT | Performed by: HOSPITALIST

## 2018-02-25 PROCEDURE — 84100 ASSAY OF PHOSPHORUS: CPT

## 2018-02-25 PROCEDURE — 83735 ASSAY OF MAGNESIUM: CPT

## 2018-02-25 PROCEDURE — 87086 URINE CULTURE/COLONY COUNT: CPT

## 2018-02-25 RX ORDER — IPRATROPIUM BROMIDE AND ALBUTEROL SULFATE 2.5; .5 MG/3ML; MG/3ML
3 SOLUTION RESPIRATORY (INHALATION)
Status: DISCONTINUED | OUTPATIENT
Start: 2018-02-26 | End: 2018-02-28

## 2018-02-25 RX ADMIN — METRONIDAZOLE 500 MG: 500 TABLET ORAL at 05:29

## 2018-02-25 RX ADMIN — LEVOTHYROXINE SODIUM 150 MCG: 150 TABLET ORAL at 05:29

## 2018-02-25 RX ADMIN — IPRATROPIUM BROMIDE AND ALBUTEROL SULFATE 3 ML: .5; 3 SOLUTION RESPIRATORY (INHALATION) at 04:30

## 2018-02-25 RX ADMIN — CETIRIZINE HYDROCHLORIDE 10 MG: 10 TABLET, FILM COATED ORAL at 09:09

## 2018-02-25 RX ADMIN — HEPARIN SODIUM 5000 UNITS: 5000 INJECTION, SOLUTION INTRAVENOUS; SUBCUTANEOUS at 21:03

## 2018-02-25 RX ADMIN — DOXYCYCLINE 100 MG: 100 TABLET ORAL at 21:02

## 2018-02-25 RX ADMIN — LOSARTAN POTASSIUM 100 MG: 50 TABLET, FILM COATED ORAL at 09:10

## 2018-02-25 RX ADMIN — ALLOPURINOL 100 MG: 100 TABLET ORAL at 09:09

## 2018-02-25 RX ADMIN — DOXYCYCLINE 100 MG: 100 TABLET ORAL at 09:09

## 2018-02-25 RX ADMIN — IPRATROPIUM BROMIDE AND ALBUTEROL SULFATE 3 ML: .5; 3 SOLUTION RESPIRATORY (INHALATION) at 14:20

## 2018-02-25 RX ADMIN — IPRATROPIUM BROMIDE AND ALBUTEROL SULFATE 3 ML: .5; 3 SOLUTION RESPIRATORY (INHALATION) at 21:30

## 2018-02-25 RX ADMIN — METRONIDAZOLE 500 MG: 500 TABLET ORAL at 14:38

## 2018-02-25 RX ADMIN — IPRATROPIUM BROMIDE AND ALBUTEROL SULFATE 3 ML: .5; 3 SOLUTION RESPIRATORY (INHALATION) at 10:31

## 2018-02-25 RX ADMIN — METRONIDAZOLE 500 MG: 500 TABLET ORAL at 23:07

## 2018-02-25 RX ADMIN — FINASTERIDE 5 MG: 5 TABLET, FILM COATED ORAL at 09:10

## 2018-02-25 RX ADMIN — CEFTRIAXONE 2 G: 2 INJECTION, POWDER, FOR SOLUTION INTRAMUSCULAR; INTRAVENOUS at 09:13

## 2018-02-25 RX ADMIN — ASPIRIN 81 MG: 81 TABLET, CHEWABLE ORAL at 09:09

## 2018-02-25 RX ADMIN — IPRATROPIUM BROMIDE AND ALBUTEROL SULFATE 3 ML: .5; 3 SOLUTION RESPIRATORY (INHALATION) at 05:58

## 2018-02-25 RX ADMIN — HEPARIN SODIUM 5000 UNITS: 5000 INJECTION, SOLUTION INTRAVENOUS; SUBCUTANEOUS at 09:11

## 2018-02-25 RX ADMIN — PREDNISONE 50 MG: 50 TABLET ORAL at 09:11

## 2018-02-25 ASSESSMENT — PAIN SCALES - GENERAL
PAINLEVEL_OUTOF10: 0

## 2018-02-25 ASSESSMENT — ENCOUNTER SYMPTOMS
HEARTBURN: 0
FEVER: 0
ABDOMINAL PAIN: 0
WHEEZING: 0
WEAKNESS: 1
PHOTOPHOBIA: 0
DIZZINESS: 0
DOUBLE VISION: 0
VOMITING: 0
NAUSEA: 0
SHORTNESS OF BREATH: 0
NECK PAIN: 0
HEADACHES: 0
DIARRHEA: 0
BLURRED VISION: 0
FALLS: 0
PALPITATIONS: 0
COUGH: 1
NERVOUS/ANXIOUS: 0

## 2018-02-25 NOTE — ED NOTES
Med rec complete per pt's family and a call to Juwan @ [phone]  Pt's family do not know his medications but have his pill box with him  Called pharmacy to confirm prescriptions  Pt took all of his medications this morning  Allergies reviewed  No ABX in last month

## 2018-02-25 NOTE — PROGRESS NOTES
I examined the patient 2/24/2018 4:28 PM  Vital Signs:/63   Pulse (!) 56   Temp 36.7 °C (98.1 °F)   Resp (!) 29   Wt 76.6 kg (168 lb 14 oz)   SpO2 94%   BMI 28.10 kg/m²   Cardiac examination significant for Bradycardia  Pulmonary examination significant for Crackles and Wheezing  Capillary refill is brisk  Peripheral Pulse is 2+   Skin is pale

## 2018-02-25 NOTE — ASSESSMENT & PLAN NOTE
- renal function continue to improve even after stopping iv hydration.  -- renal ultrasound negative for hydronephrosis with parenchymal atrophy consistent with chronic kidney disease.  -- normal uric acid and ck level.  - avoid nephrotoxins  - renally dose medication as necessary

## 2018-02-25 NOTE — H&P
Hospital Medicine History and Physical    Date of Service  2/24/2018    Chief Complaint  Chief Complaint   Patient presents with   • Cold Symptoms     cough worsening x 1 week, increasing weakness       History of Presenting Illness  89 y.o. male who presented 2/24/2018 with flulike symptoms. Patient lives by himself. He has Senior helpers coming in to help him with his everyday activities. He is accompanied by his son who is helping with history of presenting illness. Patient has underlying hearing loss. Patient is aware of being in the hospital but unaware of the month or the year. When questioned regarding who the president of the country is, he reports [place] - the son reports he is expressing his sense of humor. Per son patient is at his baseline level of mentation. Patient has been having a cough over the course of last 1 week. This has been nonproductive. There is no associated fever or chills. There has been rhinorrhea and nasal congestion. He was seen in the urgent care on February 19, 2018 for these symptoms and diagnosed with an upper respiratory tract infection and conservative management was recommended. Patient had persistent symptoms and hence patient presented to the emergency room for further evaluation. Otherwise this patient is known to have underlying severe aortic stenosis for which he is followed by Dr. Sandra Bush in the outpatient setting. I have reviewed Dr. Bush's note. Code status was discussed with the patient and his son and established to be do not resuscitate and do not intubate. Patient at this point in time will be admitted to the hospital for management of hypoxemic respiratory failure secondary to viral illness with reactive airway disease.      Primary Care Physician  Sandra Kwon M.D.    Consultants  None    Code Status  DNR / DNI  Discussed in detail with the patient and son  Updated in EMR to reflect patient wishes    Review of Systems  Review of Systems    Constitutional: Positive for malaise/fatigue. Negative for chills, diaphoresis, fever and weight loss.   HENT: Positive for congestion and hearing loss. Negative for ear discharge, ear pain, nosebleeds, sinus pain, sore throat and tinnitus.    Eyes: Negative for blurred vision, double vision, photophobia, pain, discharge and redness.   Respiratory: Positive for cough, shortness of breath and wheezing. Negative for hemoptysis, sputum production and stridor.    Cardiovascular: Negative for chest pain, palpitations, orthopnea, claudication, leg swelling and PND.   Gastrointestinal: Negative for abdominal pain, blood in stool, constipation, diarrhea, heartburn, melena, nausea and vomiting.   Genitourinary: Negative for dysuria, flank pain, frequency, hematuria and urgency.   Musculoskeletal: Negative for back pain, falls, joint pain, myalgias and neck pain.   Skin: Negative for itching and rash.   Neurological: Positive for weakness. Negative for dizziness and headaches.   Psychiatric/Behavioral: Negative for depression and suicidal ideas.        Past Medical History  Past Medical History:   Diagnosis Date   • SSS (sick sinus syndrome) (CMS-HCC) July 2009    Status post PPM implantation.   • Aortic stenosis    • Benign essential hypertension     • Benign prostatic hypertrophy     • Esophageal reflux     • Grave's disease     • History of rectal cancer     • Hypothyroidism    • Presence of permanent cardiac pacemaker         Surgical History  Past Surgical History:   Procedure Laterality Date   • PACEMAKER INSERTION  July 2009    [employer] Altrua 60 implanted by Dr. Griffin.   • OTHER ABDOMINAL SURGERY      Colostomy       Medications  No current facility-administered medications on file prior to encounter.      Current Outpatient Prescriptions on File Prior to Encounter   Medication Sig Dispense Refill   • benzonatate (TESSALON) 100 MG Cap Take 2 Caps by mouth 3 times a day as needed for Cough. 30 Cap 0   •  omeprazole (PRILOSEC) 20 MG delayed-release capsule Take 20 mg by mouth every day.     • losartan-hydrochlorothiazide (HYZAAR) 100-12.5 MG per tablet Take 1 Tab by mouth every day.     • finasteride (PROSCAR) 5 MG TABS Take 1 Tab by mouth every day.     • aspirin (ASA) 81 MG CHEW Take 1 Tab by mouth every day. 30 Each 11       Family History  Family History   Problem Relation Age of Onset   • Heart Disease Mother      Reported Family Hx of Ischemic Heart Disease   • Heart Disease Father      Reported Family Hx of Ischemic Heart Disease       Social History  Social History   Substance Use Topics   • Smoking status: Former Smoker     Years: .00     Types: Cigarettes     Quit date: 1982   • Smokeless tobacco: Never Used   • Alcohol use Yes      Comment: occas scotch       Allergies  Allergies   Allergen Reactions   • Penicillins         Physical Exam  Laboratory   Hemodynamics  Temp (24hrs), Av.7 °C (98 °F), Min:36.6 °C (97.9 °F), Max:36.7 °C (98.1 °F)   Temperature: 36.6 °C (97.9 °F)  Pulse  Av.8  Min: 52  Max: 122 Heart Rate (Monitored): 84  Blood Pressure : 135/63, NIBP: 132/88      Respiratory      Respiration: (!) 24, Pulse Oximetry: 98 %, O2 Daily Delivery Respiratory : Silicone Nasal Cannula     Given By:: Mouthpiece, Work Of Breathing / Effort: Mild;Shallow  RUL Breath Sounds: Crackles;Expiratory Wheezes, RML Breath Sounds: Crackles;Expiratory Wheezes, RLL Breath Sounds: Diminished, ETHAN Breath Sounds: Crackles;Expiratory Wheezes, LLL Breath Sounds: Diminished    Physical Exam   Constitutional: He appears well-developed and well-nourished. No distress.   HENT:   Head: Normocephalic.   Mouth/Throat: Oropharynx is clear and moist. No oropharyngeal exudate.   Eyes: Conjunctivae and EOM are normal. Pupils are equal, round, and reactive to light. No scleral icterus.   Neck: No JVD present.   Cardiovascular: Normal rate and regular rhythm.  Exam reveals no gallop and no friction rub.    Murmur  (Systolic ejection 3/6) heard.  Pulmonary/Chest: Effort normal. No stridor. No respiratory distress. He has wheezes. He has no rales. He exhibits no tenderness.   Rhonchi, clearing with cough   Abdominal: Soft. Bowel sounds are normal. He exhibits no distension. There is no tenderness. There is no rebound and no guarding.   Colostomy   Musculoskeletal: He exhibits no edema, tenderness or deformity.   Neurological: He is alert. No cranial nerve deficit.   Unaware of month, year  Baseline per son   Skin: Skin is warm and dry. He is not diaphoretic.   Psychiatric: He has a normal mood and affect. His behavior is normal. Judgment and thought content normal.       Recent Labs      02/24/18   1325   WBC  11.0*   RBC  4.55*   HEMOGLOBIN  14.2   HEMATOCRIT  40.4*   MCV  88.8   MCH  31.2   MCHC  35.1   RDW  44.3   PLATELETCT  201   MPV  9.5     Recent Labs      02/24/18   1325   SODIUM  135   POTASSIUM  3.8   CHLORIDE  102   CO2  21   GLUCOSE  145*   BUN  30*   CREATININE  1.46*   CALCIUM  9.6     Recent Labs      02/24/18   1325   ALTSGPT  9   ASTSGOT  27   ALKPHOSPHAT  42   TBILIRUBIN  1.5   GLUCOSE  145*     Recent Labs      02/24/18   1325   APTT  31.7   INR  1.13     Recent Labs      02/24/18   1325   BNPBTYPENAT  212*         Lab Results   Component Value Date    TROPONINI 0.01 08/25/2008     Urinalysis:    Lab Results  Component Value Date/Time   SPECGRAVITY 1.020 08/17/2012 1253   GLUCOSEUR Negative 08/17/2012 1253   KETONES Negative 08/17/2012 1253   NITRITE Negative 08/17/2012 1253   WBCURINE 0-2 (A) 08/17/2012 1253   RBCURINE 2-5 (A) 08/17/2012 1253   BACTERIA Few (A) 08/17/2012 1253   EPITHELCELL Few 01/31/2012 1708        Imaging  Reviewed   Assessment/Plan     I anticipate this patient will require at least two midnights for appropriate medical management, necessitating inpatient admission.    Acute hypoxemic respiratory failure (CMS-HCC)- (present on admission)   Assessment & Plan    2/2 RAD, Acute  bronchitis  Cardiac component cannot be ruled out  Obtain TTE  Continue management of RAD  Continue empiric abx therapy  Minimize IVF hydration  Continue close clinical monitoring        Acute bronchitis- (present on admission)   Assessment & Plan    Clinically patient appears to have acute bronchitis 2/2 to underlying viral illness c/b RAD  Low suspicion for underlying pneumonia, if present concern for aspiration pneumonia given advanced age  At this point initiate patient on IV ceftriaxone, Flagyl and doxycycline  Obtain speech therapy evaluation  De-escalate antibiotics as clinically appropriate        Lactic acidosis- (present on admission)   Assessment & Plan    I believe this is related to underlying reactive airway disease and hypoxemic respiratory failure with work of breathing  Patient has underlying severe aortic stenosis, elevated BNP and interstitial pulmonary edema (Cardiogenic)  I believe the risk of IV fluid hydration outweigh the benefits, patient is DNI  Provided only 250 ml bolus in ER, gentle 50 ml / hour x 24 hours only  Repeat BNP and CXRAY in am or for any worsening respiratory status  Sepsis protocol for monitoring, this does not indicate presence of sepsis at this time  Empiric abx therapy to continue, benefits out weight risks  Management of RAD with steroids  Patient will be monitored on medical RNF at this time  Case discussed with Dr Hunter from critical care team, he is in agreement with plan of care        Leukocytosis- (present on admission)   Assessment & Plan    Monocytosis is seen, reactive from illness   Likely underlying viral illness  Overall patient is hemodynamically stable and afebrile  There is no presence of sepsis, SIRS is present and related respiratory issues  Sepsis protocol for monitoring, does not indicate presence of sepsis  Empiric abx therapy at this time  Anticipate worsening with steroid usage        Reactive airway disease- (present on admission)    Assessment & Plan    Likely 2/2 underlying viral illness / bronchitis  Solumedrol x 1 62.5 mg  Prednisone 50 PO tomorrow  BD regimen  Zyrtec  Empiric Abx therapy  TTE to r/o cardiac asthma  Recommend outpatient PFTs        Aortic stenosis- (present on admission)   Assessment & Plan    Severe known history of  Maintain afterload reduction  Very cautious use of IVF hydration  Outpatient cardiology follow up   Obtain TTE        Gout- (present on admission)   Assessment & Plan    Allopurinol        CKD (chronic kidney disease), stage III- (present on admission)   Assessment & Plan    Monitor renal function / Avoid nephrotoxins and dose medications renally        DNR (do not resuscitate)- (present on admission)   Assessment & Plan    Discussed with the patient and son, 02/24/18  Code status is DNR / DNI        HTN (hypertension)- (present on admission)   Assessment & Plan    Losartan to continue  Holding HCTZ, resume as clinically appropriate        Hypothyroidism- (present on admission)   Assessment & Plan    Continue Synthroid, TSH was checked and within normal limits        History of rectal cancer- (present on admission)   Assessment & Plan    Status post colostomy        SSS (sick sinus syndrome) (CMS-HCC)- (present on admission)   Assessment & Plan    S/p PPM        Esophageal reflux- (present on admission)   Assessment & Plan    Holding PPI to limit risk of C. diff with empiric antibiotic usage        Benign prostatic hypertrophy- (present on admission)   Assessment & Plan    Continue finasteride            VTE prophylaxis: SCD and SC Heparin.

## 2018-02-25 NOTE — RESPIRATORY CARE
COPD EDUCATION by COPD CLINICAL EDUCATOR  2/25/2018 at 6:48 AM by Kelly Guardado     Patient reviewed by COPD education team. Patient does not qualify for COPD program.

## 2018-02-25 NOTE — PROGRESS NOTES
Pt arrived to floor. Dysphagia screening tool implemented, pt passed prior to PO meds. Pt is A+Ox3, disoriented to time. SANTA. Denies pain, n/v or n/t. Pt on 2 lpm via NC satting in 90s,  in use. Expiratory wheezes and coarse crackles auscultated. Respiratory therapist notified. Attempted to collect sputum culture, pt unable to provide sputum. Incontinent of urine. All questions answered regarding POC.

## 2018-02-25 NOTE — ASSESSMENT & PLAN NOTE
-- likely due acute bronchitis.  - elevated lactic acid  - weaned off supplemental oxygen.  - taper steroids  - audible upper airway congestion  - pulmonary toilet - IS/Cough/DB  - RT protocol - Nebs

## 2018-02-25 NOTE — PROGRESS NOTES
[facility] Hospitalist Progress Note    Date of Service: 2018    Chief Complaint  89 y.o. male admitted 2018 with cough x 1 week and weakness.    Interval Problem Update  - audible upper airway congestion with wheezing - he does not notice this  - alert, disoriented to time and situation, pleasant without behavior disturbance  - (-) procalcitonin - awaiting ECHO (last ECHO on file here was from )    Consultants/Specialty  NA    Disposition  Undetermined at this time.         Review of Systems   Constitutional: Negative for fever.   HENT: Positive for hearing loss. Negative for congestion.    Eyes: Negative for blurred vision, double vision and photophobia.   Respiratory: Positive for cough. Negative for shortness of breath and wheezing.    Cardiovascular: Negative for chest pain and palpitations.   Gastrointestinal: Negative for abdominal pain, diarrhea, heartburn, nausea and vomiting.   Musculoskeletal: Negative for falls and neck pain.   Neurological: Positive for weakness. Negative for dizziness and headaches.   Psychiatric/Behavioral: The patient is not nervous/anxious.       Physical Exam  Laboratory/Imaging   Hemodynamics  Temp (24hrs), Av.5 °C (97.7 °F), Min:36.1 °C (96.9 °F), Max:36.7 °C (98 °F)   Temperature: 36.6 °C (97.8 °F)  Pulse  Av.5  Min: 52  Max: 122 Heart Rate (Monitored): 84  Blood Pressure : 110/74, NIBP: 132/88      Respiratory      Respiration: 18, Pulse Oximetry: 97 %, O2 Daily Delivery Respiratory : Silicone Nasal Cannula     Given By:: Mask, Work Of Breathing / Effort: Mild  RUL Breath Sounds: Crackles;Expiratory Wheezes, RML Breath Sounds: Crackles;Expiratory Wheezes, RLL Breath Sounds: Crackles;Expiratory Wheezes, ETHAN Breath Sounds: Crackles;Expiratory Wheezes, LLL Breath Sounds: Crackles;Expiratory Wheezes    Fluids    Intake/Output Summary (Last 24 hours) at 18 1352  Last data filed at 18 0400   Gross per 24 hour   Intake              300 ml   Output                 0 ml   Net              300 ml       Nutrition  Orders Placed This Encounter   Procedures   • Diet Order     Standing Status:   Standing     Number of Occurrences:   1     Order Specific Question:   Diet:     Answer:   Regular [1]     Physical Exam   Constitutional: Vital signs are normal. He appears well-developed. He is cooperative.  Non-toxic appearance. He has a sickly appearance. Nasal cannula in place.   HENT:   Head: Normocephalic.   Right Ear: Decreased hearing is noted.   Left Ear: Decreased hearing is noted.   Nose: Nose normal.   Mouth/Throat: Oropharynx is clear and moist and mucous membranes are normal.   Eyes: Conjunctivae are normal. Right conjunctiva is not injected. Left conjunctiva is not injected.   Neck: Phonation normal. No JVD present.   Audible upper airway congestion.    Cardiovascular: Normal rate and intact distal pulses.    Murmur heard.  Pulmonary/Chest: No respiratory distress. He has wheezes. He has rhonchi. He has rales.   Abdominal: Soft. Normal appearance and bowel sounds are normal. There is no tenderness.   Neurological: He is alert. GCS eye subscore is 4. GCS verbal subscore is 4. GCS motor subscore is 6.   Disoriented to situation and time.    Skin: Skin is warm and dry. No rash noted. Nails show no clubbing.   Psychiatric: He has a normal mood and affect. His behavior is normal. Judgment and thought content normal. Cognition and memory are impaired. He exhibits abnormal remote memory.   Nursing note and vitals reviewed.      Recent Labs      02/24/18   1325  02/25/18   0127   WBC  11.0*  9.9   RBC  4.55*  4.33*   HEMOGLOBIN  14.2  13.2*   HEMATOCRIT  40.4*  39.9*   MCV  88.8  92.1   MCH  31.2  30.5   MCHC  35.1  33.1*   RDW  44.3  46.5   PLATELETCT  201  185   MPV  9.5  9.7     Recent Labs      02/24/18   1325  02/25/18   0127   SODIUM  135  135   POTASSIUM  3.8  4.2   CHLORIDE  102  102   CO2  21  19*   GLUCOSE  145*  238*   BUN  30*  36*   CREATININE  1.46*  1.70*    CALCIUM  9.6  9.1     Recent Labs      02/24/18   1325   APTT  31.7   INR  1.13     Recent Labs      02/24/18   1325  02/25/18   0127   BNPBTYPENAT  212*  126*              Assessment/Plan     Acute hypoxemic respiratory failure (CMS-HCC)- (present on admission)   Assessment & Plan    - etiology unclear at this time  - elevated lactic acid  - requiring supplemental O2 to keep sats >90%  - steroids  - audible upper airway congestion  - pulmonary toilet - IS/Cough/DB  - RT protocol - Nebs        Acute bronchitis- (present on admission)   Assessment & Plan    Clinically patient appears to have acute bronchitis 2/2 to underlying viral illness c/b RAD  Low suspicion for underlying pneumonia, if present concern for aspiration pneumonia given advanced age  At this point initiate patient on IV ceftriaxone, Flagyl and doxycycline  Obtain speech therapy evaluation  De-escalate antibiotics as clinically appropriate        Lactic acidosis- (present on admission)   Assessment & Plan    I believe this is related to underlying reactive airway disease and hypoxemic respiratory failure with work of breathing  Patient has underlying severe aortic stenosis, elevated BNP and interstitial pulmonary edema (Cardiogenic)  I believe the risk of IV fluid hydration outweigh the benefits, patient is DNI  Provided only 250 ml bolus in ER, gentle 50 ml / hour x 24 hours only  Repeat BNP and CXRAY in am or for any worsening respiratory status  Sepsis protocol for monitoring, this does not indicate presence of sepsis at this time  Empiric abx therapy to continue, benefits out weight risks  Management of RAD with steroids  Patient will be monitored on medical RNF at this time  Case discussed with Dr Hunter from critical care team, he is in agreement with plan of care        Leukocytosis- (present on admission)   Assessment & Plan    Monocytosis is seen, reactive from illness   Likely underlying viral illness  Overall patient is  hemodynamically stable and afebrile  There is no presence of sepsis, SIRS is present and related respiratory issues  Sepsis protocol for monitoring, does not indicate presence of sepsis  Empiric abx therapy at this time  Anticipate worsening with steroid usage        Reactive airway disease- (present on admission)   Assessment & Plan    Likely 2/2 underlying viral illness / bronchitis  Solumedrol x 1 62.5 mg  Prednisone 50 PO tomorrow  BD regimen  Zyrtec  Empiric Abx therapy  TTE to r/o cardiac asthma  Recommend outpatient PFTs        Aortic stenosis- (present on admission)   Assessment & Plan    - last ECHO on file here was June 2009: showed EF 65% with diastolic dysfunction and mild aortic stenosis and mild tricuspid regurg  - ECHO pending        Gout- (present on admission)   Assessment & Plan    Allopurinol        CKD (chronic kidney disease), stage III- (present on admission)   Assessment & Plan    - currently stable at his baseline  - avoid nephrotoxins  - renally dose medications as necessary  - am BMP        DNR (do not resuscitate)- (present on admission)   Assessment & Plan    Discussed with the patient and son, 02/24/18  Code status is DNR / DNI        HTN (hypertension)- (present on admission)   Assessment & Plan    Losartan to continue  Holding HCTZ, resume as clinically appropriate        Hypothyroidism- (present on admission)   Assessment & Plan    - chronic on Levothyroxine        History of rectal cancer- (present on admission)   Assessment & Plan    - with presence of colostomy        SSS (sick sinus syndrome) (CMS-HCC)- (present on admission)   Assessment & Plan    S/p PPM        Esophageal reflux- (present on admission)   Assessment & Plan    Holding PPI to limit risk of C. diff with empiric antibiotic usage        Benign prostatic hypertrophy- (present on admission)   Assessment & Plan    Continue finasteride          Quality-Core Measures   Reviewed items::  Medications reviewed and Radiology  images reviewed  Shaw catheter::  No Shaw  DVT prophylaxis pharmacological::  Heparin  DVT prophylaxis - mechanical:  SCDs  Ulcer Prophylaxis::  No      RAND Bearden

## 2018-02-25 NOTE — ASSESSMENT & PLAN NOTE
Held PPI to limit risk of C. diff with antibiotic.  With discontinuation of antibiotic, will resume ppi.

## 2018-02-25 NOTE — ASSESSMENT & PLAN NOTE
- with presence of colostomy.  The patient's the patient is status post surgery 15 years ago and he has not been told to have recurrence of cancer in the last 15 years.  Stable

## 2018-02-25 NOTE — ASSESSMENT & PLAN NOTE
Clinically patient appears to have acute bronchitis 2/2 to underlying viral illness  Diet modify per speech therapy. Dysphagia II, Thin Liquid.  finished IV antibiotic as procalcitonin level normalized.

## 2018-02-25 NOTE — ASSESSMENT & PLAN NOTE
- possibly due to URI  - tapering steroids gradually, will discontinue soon.  -weaned off supplemental O2   - IV ABX  - RT protocol  - Nebs/inhalers

## 2018-02-25 NOTE — ED NOTES
Lab called reports they are re-doing the test for C rate, phos, and mag possibly inaccurate results with first test.

## 2018-02-25 NOTE — CARE PLAN
Problem: Venous Thromboembolism (VTW)/Deep Vein Thrombosis (DVT) Prevention:  Goal: Patient will participate in Venous Thrombosis (VTE)/Deep Vein Thrombosis (DVT)Prevention Measures  Chem and mech prophylaxis given     Problem: Respiratory:  Goal: Respiratory status will improve  Pt on 2 LPM via NC satting in 90s.  at bedside. IS provided and encouraged use.

## 2018-02-25 NOTE — PROGRESS NOTES
Blaire from Lab called with critical result of Lactic Acid at 4.0. Critical lab result read back to Blaire.   Dr. Blanton  notified of critical lab result at 2030.  Critical lab result read back by Dr. Blanton .

## 2018-02-25 NOTE — CARE PLAN
Problem: Bronchoconstriction:  Goal: Improve in air movement and diminished wheezing  Duoneb Q$  2 lpm nc

## 2018-02-25 NOTE — PROGRESS NOTES
Assumed care of patient at 0700.  Received report from night RN.  Pt alert and oriented x 2, has no complaints of pain.  Pt with strong non productive cough.  Colostomy intact.  Pt resting comfortably in bed. Bed alarm on.

## 2018-02-25 NOTE — ASSESSMENT & PLAN NOTE
Likely secondary to tissue hypoperfusion from dehydration. No signs of active infection. White count elevated slightly this morning. Could be secondary to atelectasis. Encourage incentive spirometry. We'll check chest x-ray in the morning.

## 2018-02-25 NOTE — ASSESSMENT & PLAN NOTE
- last ECHO on file here was June 2009: showed EF 65% with diastolic dysfunction and mild aortic stenosis and mild tricuspid regurg  - latest ECHO showed mild aortic stenosis with EF 60%

## 2018-02-25 NOTE — ASSESSMENT & PLAN NOTE
2/2 RAD, Acute bronchitis  Cardiac component cannot be ruled out  Obtain TTE  Continue management of RAD  Continue empiric abx therapy  Minimize IVF hydration  Continue close clinical monitoring

## 2018-02-26 PROBLEM — N17.9 ACUTE RENAL FAILURE SUPERIMPOSED ON STAGE 3 CHRONIC KIDNEY DISEASE (HCC): Status: ACTIVE | Noted: 2018-02-24

## 2018-02-26 LAB
ANION GAP SERPL CALC-SCNC: 13 MMOL/L (ref 0–11.9)
BASOPHILS # BLD AUTO: 0.1 % (ref 0–1.8)
BASOPHILS # BLD: 0.02 K/UL (ref 0–0.12)
BUN SERPL-MCNC: 62 MG/DL (ref 8–22)
CALCIUM SERPL-MCNC: 9 MG/DL (ref 8.5–10.5)
CHLORIDE SERPL-SCNC: 103 MMOL/L (ref 96–112)
CO2 SERPL-SCNC: 18 MMOL/L (ref 20–33)
CREAT SERPL-MCNC: 2.56 MG/DL (ref 0.5–1.4)
EOSINOPHIL # BLD AUTO: 0 K/UL (ref 0–0.51)
EOSINOPHIL NFR BLD: 0 % (ref 0–6.9)
ERYTHROCYTE [DISTWIDTH] IN BLOOD BY AUTOMATED COUNT: 44.4 FL (ref 35.9–50)
GLUCOSE SERPL-MCNC: 165 MG/DL (ref 65–99)
HCT VFR BLD AUTO: 37.2 % (ref 42–52)
HGB BLD-MCNC: 12.9 G/DL (ref 14–18)
IMM GRANULOCYTES # BLD AUTO: 0.28 K/UL (ref 0–0.11)
IMM GRANULOCYTES NFR BLD AUTO: 1.7 % (ref 0–0.9)
LACTATE BLD-SCNC: 2.7 MMOL/L (ref 0.5–2)
LACTATE BLD-SCNC: 2.9 MMOL/L (ref 0.5–2)
LV EJECT FRACT  99904: 60
LV EJECT FRACT MOD 2C 99903: 69.97
LV EJECT FRACT MOD 4C 99902: 71.66
LV EJECT FRACT MOD BP 99901: 70.13
LYMPHOCYTES # BLD AUTO: 0.53 K/UL (ref 1–4.8)
LYMPHOCYTES NFR BLD: 3.2 % (ref 22–41)
MCH RBC QN AUTO: 31.2 PG (ref 27–33)
MCHC RBC AUTO-ENTMCNC: 34.7 G/DL (ref 33.7–35.3)
MCV RBC AUTO: 90.1 FL (ref 81.4–97.8)
MONOCYTES # BLD AUTO: 1.02 K/UL (ref 0–0.85)
MONOCYTES NFR BLD AUTO: 6.2 % (ref 0–13.4)
NEUTROPHILS # BLD AUTO: 14.54 K/UL (ref 1.82–7.42)
NEUTROPHILS NFR BLD: 88.8 % (ref 44–72)
NRBC # BLD AUTO: 0 K/UL
NRBC BLD-RTO: 0 /100 WBC
PLATELET # BLD AUTO: 210 K/UL (ref 164–446)
PMV BLD AUTO: 10.1 FL (ref 9–12.9)
POTASSIUM SERPL-SCNC: 3.8 MMOL/L (ref 3.6–5.5)
RBC # BLD AUTO: 4.13 M/UL (ref 4.7–6.1)
SODIUM SERPL-SCNC: 134 MMOL/L (ref 135–145)
WBC # BLD AUTO: 16.4 K/UL (ref 4.8–10.8)

## 2018-02-26 PROCEDURE — 770006 HCHG ROOM/CARE - MED/SURG/GYN SEMI*

## 2018-02-26 PROCEDURE — 700111 HCHG RX REV CODE 636 W/ 250 OVERRIDE (IP): Performed by: INTERNAL MEDICINE

## 2018-02-26 PROCEDURE — 700105 HCHG RX REV CODE 258: Performed by: HOSPITALIST

## 2018-02-26 PROCEDURE — G8996 SWALLOW CURRENT STATUS: HCPCS | Mod: CJ

## 2018-02-26 PROCEDURE — 93306 TTE W/DOPPLER COMPLETE: CPT

## 2018-02-26 PROCEDURE — 700102 HCHG RX REV CODE 250 W/ 637 OVERRIDE(OP): Performed by: INTERNAL MEDICINE

## 2018-02-26 PROCEDURE — G8987 SELF CARE CURRENT STATUS: HCPCS | Mod: CK

## 2018-02-26 PROCEDURE — 92610 EVALUATE SWALLOWING FUNCTION: CPT

## 2018-02-26 PROCEDURE — 80048 BASIC METABOLIC PNL TOTAL CA: CPT

## 2018-02-26 PROCEDURE — 94640 AIRWAY INHALATION TREATMENT: CPT

## 2018-02-26 PROCEDURE — G8997 SWALLOW GOAL STATUS: HCPCS | Mod: CH

## 2018-02-26 PROCEDURE — 36415 COLL VENOUS BLD VENIPUNCTURE: CPT

## 2018-02-26 PROCEDURE — G8988 SELF CARE GOAL STATUS: HCPCS | Mod: CI

## 2018-02-26 PROCEDURE — 700101 HCHG RX REV CODE 250: Performed by: HOSPITALIST

## 2018-02-26 PROCEDURE — A9270 NON-COVERED ITEM OR SERVICE: HCPCS | Performed by: INTERNAL MEDICINE

## 2018-02-26 PROCEDURE — 99232 SBSQ HOSP IP/OBS MODERATE 35: CPT | Performed by: HOSPITALIST

## 2018-02-26 PROCEDURE — 94760 N-INVAS EAR/PLS OXIMETRY 1: CPT

## 2018-02-26 PROCEDURE — 85025 COMPLETE CBC W/AUTO DIFF WBC: CPT

## 2018-02-26 PROCEDURE — 97165 OT EVAL LOW COMPLEX 30 MIN: CPT

## 2018-02-26 PROCEDURE — 83605 ASSAY OF LACTIC ACID: CPT | Mod: 91

## 2018-02-26 RX ORDER — SODIUM CHLORIDE 9 MG/ML
INJECTION, SOLUTION INTRAVENOUS CONTINUOUS
Status: DISCONTINUED | OUTPATIENT
Start: 2018-02-26 | End: 2018-03-01

## 2018-02-26 RX ADMIN — IPRATROPIUM BROMIDE AND ALBUTEROL SULFATE 3 ML: .5; 3 SOLUTION RESPIRATORY (INHALATION) at 06:32

## 2018-02-26 RX ADMIN — CEFTRIAXONE 2 G: 2 INJECTION, POWDER, FOR SOLUTION INTRAMUSCULAR; INTRAVENOUS at 09:22

## 2018-02-26 RX ADMIN — METRONIDAZOLE 500 MG: 500 TABLET ORAL at 22:27

## 2018-02-26 RX ADMIN — HEPARIN SODIUM 5000 UNITS: 5000 INJECTION, SOLUTION INTRAVENOUS; SUBCUTANEOUS at 22:27

## 2018-02-26 RX ADMIN — DOXYCYCLINE 100 MG: 100 TABLET ORAL at 09:23

## 2018-02-26 RX ADMIN — LOSARTAN POTASSIUM 100 MG: 50 TABLET, FILM COATED ORAL at 09:23

## 2018-02-26 RX ADMIN — ERGOCALCIFEROL 50000 UNITS: 1.25 CAPSULE ORAL at 09:23

## 2018-02-26 RX ADMIN — DOXYCYCLINE 100 MG: 100 TABLET ORAL at 22:27

## 2018-02-26 RX ADMIN — IPRATROPIUM BROMIDE AND ALBUTEROL SULFATE 3 ML: .5; 3 SOLUTION RESPIRATORY (INHALATION) at 20:13

## 2018-02-26 RX ADMIN — PREDNISONE 50 MG: 50 TABLET ORAL at 09:23

## 2018-02-26 RX ADMIN — HEPARIN SODIUM 5000 UNITS: 5000 INJECTION, SOLUTION INTRAVENOUS; SUBCUTANEOUS at 09:23

## 2018-02-26 RX ADMIN — ALLOPURINOL 100 MG: 100 TABLET ORAL at 09:23

## 2018-02-26 RX ADMIN — METRONIDAZOLE 500 MG: 500 TABLET ORAL at 15:33

## 2018-02-26 RX ADMIN — CETIRIZINE HYDROCHLORIDE 10 MG: 10 TABLET, FILM COATED ORAL at 09:23

## 2018-02-26 RX ADMIN — SODIUM CHLORIDE: 9 INJECTION, SOLUTION INTRAVENOUS at 09:23

## 2018-02-26 RX ADMIN — IPRATROPIUM BROMIDE AND ALBUTEROL SULFATE 3 ML: .5; 3 SOLUTION RESPIRATORY (INHALATION) at 14:27

## 2018-02-26 RX ADMIN — FINASTERIDE 5 MG: 5 TABLET, FILM COATED ORAL at 09:23

## 2018-02-26 RX ADMIN — METRONIDAZOLE 500 MG: 500 TABLET ORAL at 05:36

## 2018-02-26 RX ADMIN — LEVOTHYROXINE SODIUM 150 MCG: 150 TABLET ORAL at 05:37

## 2018-02-26 RX ADMIN — ASPIRIN 81 MG: 81 TABLET, CHEWABLE ORAL at 09:23

## 2018-02-26 ASSESSMENT — PAIN SCALES - GENERAL
PAINLEVEL_OUTOF10: 0

## 2018-02-26 ASSESSMENT — ENCOUNTER SYMPTOMS
SPEECH CHANGE: 0
NAUSEA: 0
FEVER: 0
HEADACHES: 0
HEARTBURN: 0
ABDOMINAL PAIN: 0
NERVOUS/ANXIOUS: 0
PHOTOPHOBIA: 0
COUGH: 1
FOCAL WEAKNESS: 0
DIARRHEA: 0
VOMITING: 0
BLURRED VISION: 0
PALPITATIONS: 0
WEAKNESS: 1
SHORTNESS OF BREATH: 0
WHEEZING: 0
NECK PAIN: 0
FALLS: 0
DIZZINESS: 0
DOUBLE VISION: 0

## 2018-02-26 ASSESSMENT — COGNITIVE AND FUNCTIONAL STATUS - GENERAL
PERSONAL GROOMING: A LITTLE
DRESSING REGULAR UPPER BODY CLOTHING: A LITTLE
SUGGESTED CMS G CODE MODIFIER DAILY ACTIVITY: CK
DRESSING REGULAR LOWER BODY CLOTHING: A LITTLE
EATING MEALS: A LITTLE
HELP NEEDED FOR BATHING: A LOT
TOILETING: A LITTLE
DAILY ACTIVITIY SCORE: 17

## 2018-02-26 ASSESSMENT — ACTIVITIES OF DAILY LIVING (ADL): TOILETING: INDEPENDENT

## 2018-02-26 NOTE — PROGRESS NOTES
CHADWICK but answered all questions appropriately. Caregiver at bedside and reports that he has dementia. Call bell and fall prevention reinforced. No c/o at this time

## 2018-02-26 NOTE — THERAPY
"Occupational Therapy Evaluation completed.   Functional Status:  Pt is a 89 y/o male admitted for respiratory failure, bronchitis, and lactic acidosis. He is very pleasant and cooperative, [place] and intermittently disoriented, easily reoriented. He currently requires Mundo for bed mobility. CGA sit<>stand and for functional txf to the chair for breakfast. He needed Mundo for LB and UB dressing. Setup feeding. Pt limited by weakness, fatigue, impaired balance, and impaired cognition which impacts independence in ADLs and functional mobility.   Plan of Care: Will benefit from Occupational Therapy 3 times per week  Discharge Recommendations:  Equipment: Will Continue to Assess for Equipment Needs. Post-acute therapy undetermined, depends on progress made and support at home.    See \"Rehab Therapy-Acute\" Patient Summary Report for complete documentation.    "

## 2018-02-26 NOTE — PROGRESS NOTES
"[facility] Hospitalist Progress Note    Date of Service: 2018    Chief Complaint  89 y.o. male admitted 2018 with cough x 1 week and weakness.     Interval Problem Update  - sitting up in the chair in no acute distress. Appears more comfortable today.  - he reports he is understanding this might be the \"end of the road\" for him. Palliative to see.  - still requiring supplemental O2  - worsening ARF today - started hydration     - audible upper airway congestion with wheezing - he does not notice this  - alert, disoriented to time and situation, pleasant without behavior disturbance  - (-) procalcitonin - awaiting ECHO (last ECHO on file here was from )     Consultants/Specialty  NA    Disposition  Undetermined at this time        Review of Systems   Constitutional: Negative for fever.   HENT: Positive for hearing loss. Negative for congestion.    Eyes: Negative for blurred vision, double vision and photophobia.   Respiratory: Positive for cough. Negative for shortness of breath and wheezing.    Cardiovascular: Negative for chest pain and palpitations.   Gastrointestinal: Negative for abdominal pain, diarrhea, heartburn, nausea and vomiting.   Musculoskeletal: Negative for falls and neck pain.   Neurological: Positive for weakness. Negative for dizziness, speech change, focal weakness and headaches.   Psychiatric/Behavioral: The patient is not nervous/anxious.       Physical Exam  Laboratory/Imaging   Hemodynamics  Temp (24hrs), Av.5 °C (97.7 °F), Min:36.4 °C (97.5 °F), Max:36.7 °C (98.1 °F)   Temperature: 36.5 °C (97.7 °F)  Pulse  Av  Min: 52  Max: 122    Blood Pressure : 143/62      Respiratory      Respiration: 18, Pulse Oximetry: 98 %, O2 Daily Delivery Respiratory : Silicone Nasal Cannula     Given By:: Mask, Work Of Breathing / Effort: Moderate  RUL Breath Sounds: Crackles;Expiratory Wheezes, RML Breath Sounds: Crackles;Expiratory Wheezes, RLL Breath Sounds: Crackles;Expiratory Wheezes, " ETHAN Breath Sounds: Crackles;Expiratory Wheezes, LLL Breath Sounds: Crackles;Expiratory Wheezes    Fluids  No intake or output data in the 24 hours ending 02/26/18 1422    Nutrition  Orders Placed This Encounter   Procedures   • Diet Order     Standing Status:   Standing     Number of Occurrences:   1     Order Specific Question:   Diet:     Answer:   Regular [1]     Order Specific Question:   Texture/Fiber modifications:     Answer:   Dysphagia 2(Pureed/Chopped)specify fluid consistency(question 6) [2]     Order Specific Question:   Consistency/Fluid modifications:     Answer:   Thin Liquids [3]     Physical Exam   Constitutional: Vital signs are normal. He is cooperative. He has a sickly appearance. No distress. Nasal cannula in place.   HENT:   Head: Normocephalic.   Right Ear: Hearing normal.   Left Ear: Hearing normal.   Nose: Nose normal.   Mouth/Throat: Oropharynx is clear and moist and mucous membranes are normal.   Eyes: Lids are normal. Scleral icterus is present.   Neck: Phonation normal. No JVD present.   Cardiovascular: Regular rhythm and intact distal pulses.    Murmur heard.  Pulmonary/Chest: Effort normal and breath sounds normal. He has no wheezes. He has no rales.   Neurological: He is alert.   Nursing note and vitals reviewed.      Recent Labs      02/24/18   1325  02/25/18   0127  02/26/18   0407   WBC  11.0*  9.9  16.4*   RBC  4.55*  4.33*  4.13*   HEMOGLOBIN  14.2  13.2*  12.9*   HEMATOCRIT  40.4*  39.9*  37.2*   MCV  88.8  92.1  90.1   MCH  31.2  30.5  31.2   MCHC  35.1  33.1*  34.7   RDW  44.3  46.5  44.4   PLATELETCT  201  185  210   MPV  9.5  9.7  10.1     Recent Labs      02/24/18   1325  02/25/18   0127  02/26/18   0407   SODIUM  135  135  134*   POTASSIUM  3.8  4.2  3.8   CHLORIDE  102  102  103   CO2  21  19*  18*   GLUCOSE  145*  238*  165*   BUN  30*  36*  62*   CREATININE  1.46*  1.70*  2.56*   CALCIUM  9.6  9.1  9.0     Recent Labs      02/24/18   1325   APTT  31.7   INR  1.13      Recent Labs      02/24/18   1325  02/25/18   0127   BNPBTYPENAT  212*  126*              Assessment/Plan     Acute renal failure superimposed on stage 3 chronic kidney disease (CMS-HCC)- (present on admission)   Assessment & Plan    - currently stable at his baseline  - avoid nephrotoxins  - renally dose medications as necessary  - am BMP        Acute hypoxemic respiratory failure (CMS-HCC)- (present on admission)   Assessment & Plan    - etiology unclear at this time  - elevated lactic acid  - requiring supplemental O2 to keep sats >90%  - steroids  - audible upper airway congestion  - pulmonary toilet - IS/Cough/DB  - RT protocol - Nebs        Acute bronchitis- (present on admission)   Assessment & Plan    Clinically patient appears to have acute bronchitis 2/2 to underlying viral illness c/b RAD  Low suspicion for underlying pneumonia, if present concern for aspiration pneumonia given advanced age  At this point initiate patient on IV ceftriaxone, Flagyl and doxycycline  Obtain speech therapy evaluation  De-escalate antibiotics as clinically appropriate        DNR (do not resuscitate)- (present on admission)   Assessment & Plan    - Patient speaks about his time on [place] getting shorter   - Palliative care consult today        Lactic acidosis- (present on admission)   Assessment & Plan    - likely from steroids  - WBC 16.4 this morning  - no fevers  - trending down  - empiric IV ABX  - BC negative to date        Leukocytosis- (present on admission)   Assessment & Plan    - likely from steroids  - no fevers  - am CBC        Reactive airway disease- (present on admission)   Assessment & Plan    - possibly due to URI  - steroids  - supplemental O2   - IV ABX  - RT protocol  - Nebs/inhalers          Aortic stenosis- (present on admission)   Assessment & Plan    - last ECHO on file here was June 2009: showed EF 65% with diastolic dysfunction and mild aortic stenosis and mild tricuspid regurg  - ECHO today showed  mild aortic stenosis with EF 60%              Gout- (present on admission)   Assessment & Plan    Allopurinol        HTN (hypertension)- (present on admission)   Assessment & Plan    Losartan to continue  Holding HCTZ, resume as clinically appropriate        Hypothyroidism- (present on admission)   Assessment & Plan    - chronic on Levothyroxine        History of rectal cancer- (present on admission)   Assessment & Plan    - with presence of colostomy        SSS (sick sinus syndrome) (CMS-HCC)- (present on admission)   Assessment & Plan    - history of  - presence of ACID/PPM        Esophageal reflux- (present on admission)   Assessment & Plan    Holding PPI to limit risk of C. diff with empiric antibiotic usage        Benign prostatic hypertrophy- (present on admission)   Assessment & Plan    Continue finasteride          Quality-Core Measures    DEB Bearden.

## 2018-02-26 NOTE — CARE PLAN
Problem: Safety  Goal: Will remain free from falls  Outcome: PROGRESSING AS EXPECTED  Reviewed patient's mobility status, discussed with care team of patient needs, verifying appropriate safety precautions in place, providing patient education, ensuring call lights are within reach, non-slip socks in use, evaluating needs alarms & monitoring every shift, continuing with current plan of care.      Problem: Knowledge Deficit  Goal: Knowledge of the prescribed therapeutic regimen will improve  Outcome: PROGRESSING AS EXPECTED  Educated patient about POC, activities, encouraging patient to ask questions, providing answers to patient's questions, educating patient about medications, encouraging patient involvement in care process. Continuing with current POC.

## 2018-02-26 NOTE — WOUND TEAM
"[facility]   Inpatient Services   Established Ostomy Management/ troubleshooting  HPI: Reviewed  PMH: Reviewed   SH: Reviewed   Reason for Ostomy nurse consult:  Assess ostomy needs  Subjective: \"I don't remember.\"  Objective: appliance full of flatus, small brown stool  Ostomy type: colostomy  Stoma location: [facility]  Stoma assessment:    Appearance:  Red, moist, round   Size: 1\"   Protrusion: <1\"   MC jxn: resolved   Peristomal skin: intact    Ostomy Appliance (type and size): one piece 2.5\" with paste ring  Assessment: functioning colostomy  Interventions:  Removed appliance, cleaned skin, dried. Cut barrier to 1.25\" applied paste ring, then applied appliance. Closed end.   Pt education: none  Plan: Nsg to perform ostomy care. Ostomy nurses to follow for ostomy needs PRN difficulties.  Anticipated discharge needs:none    "

## 2018-02-27 LAB
ANION GAP SERPL CALC-SCNC: 15 MMOL/L (ref 0–11.9)
BASOPHILS # BLD AUTO: 0.2 % (ref 0–1.8)
BASOPHILS # BLD: 0.04 K/UL (ref 0–0.12)
BUN SERPL-MCNC: 71 MG/DL (ref 8–22)
CALCIUM SERPL-MCNC: 9.5 MG/DL (ref 8.5–10.5)
CHLORIDE SERPL-SCNC: 102 MMOL/L (ref 96–112)
CK SERPL-CCNC: 57 U/L (ref 0–154)
CO2 SERPL-SCNC: 17 MMOL/L (ref 20–33)
CREAT SERPL-MCNC: 2.5 MG/DL (ref 0.5–1.4)
EOSINOPHIL # BLD AUTO: 0.01 K/UL (ref 0–0.51)
EOSINOPHIL NFR BLD: 0 % (ref 0–6.9)
ERYTHROCYTE [DISTWIDTH] IN BLOOD BY AUTOMATED COUNT: 44.6 FL (ref 35.9–50)
GLUCOSE SERPL-MCNC: 199 MG/DL (ref 65–99)
HCT VFR BLD AUTO: 43.2 % (ref 42–52)
HGB BLD-MCNC: 15.1 G/DL (ref 14–18)
IMM GRANULOCYTES # BLD AUTO: 0.69 K/UL (ref 0–0.11)
IMM GRANULOCYTES NFR BLD AUTO: 3.4 % (ref 0–0.9)
LACTATE BLD-SCNC: 3.2 MMOL/L (ref 0.5–2)
LYMPHOCYTES # BLD AUTO: 0.75 K/UL (ref 1–4.8)
LYMPHOCYTES NFR BLD: 3.7 % (ref 22–41)
MCH RBC QN AUTO: 31.6 PG (ref 27–33)
MCHC RBC AUTO-ENTMCNC: 35 G/DL (ref 33.7–35.3)
MCV RBC AUTO: 90.4 FL (ref 81.4–97.8)
MONOCYTES # BLD AUTO: 0.39 K/UL (ref 0–0.85)
MONOCYTES NFR BLD AUTO: 1.9 % (ref 0–13.4)
NEUTROPHILS # BLD AUTO: 18.39 K/UL (ref 1.82–7.42)
NEUTROPHILS NFR BLD: 90.8 % (ref 44–72)
NRBC # BLD AUTO: 0 K/UL
NRBC BLD-RTO: 0 /100 WBC
PLATELET # BLD AUTO: 302 K/UL (ref 164–446)
PMV BLD AUTO: 9.9 FL (ref 9–12.9)
POTASSIUM SERPL-SCNC: 3.7 MMOL/L (ref 3.6–5.5)
RBC # BLD AUTO: 4.78 M/UL (ref 4.7–6.1)
SODIUM SERPL-SCNC: 134 MMOL/L (ref 135–145)
URATE SERPL-MCNC: 6.2 MG/DL (ref 2.5–8.3)
WBC # BLD AUTO: 20.3 K/UL (ref 4.8–10.8)

## 2018-02-27 PROCEDURE — 82550 ASSAY OF CK (CPK): CPT

## 2018-02-27 PROCEDURE — G8979 MOBILITY GOAL STATUS: HCPCS | Mod: CI

## 2018-02-27 PROCEDURE — 700105 HCHG RX REV CODE 258: Performed by: HOSPITALIST

## 2018-02-27 PROCEDURE — G8978 MOBILITY CURRENT STATUS: HCPCS | Mod: CK

## 2018-02-27 PROCEDURE — 80048 BASIC METABOLIC PNL TOTAL CA: CPT

## 2018-02-27 PROCEDURE — A9270 NON-COVERED ITEM OR SERVICE: HCPCS | Performed by: INTERNAL MEDICINE

## 2018-02-27 PROCEDURE — 700111 HCHG RX REV CODE 636 W/ 250 OVERRIDE (IP): Performed by: INTERNAL MEDICINE

## 2018-02-27 PROCEDURE — 85025 COMPLETE CBC W/AUTO DIFF WBC: CPT

## 2018-02-27 PROCEDURE — 84550 ASSAY OF BLOOD/URIC ACID: CPT

## 2018-02-27 PROCEDURE — 83605 ASSAY OF LACTIC ACID: CPT

## 2018-02-27 PROCEDURE — 700101 HCHG RX REV CODE 250: Performed by: INTERNAL MEDICINE

## 2018-02-27 PROCEDURE — 36415 COLL VENOUS BLD VENIPUNCTURE: CPT

## 2018-02-27 PROCEDURE — 94640 AIRWAY INHALATION TREATMENT: CPT

## 2018-02-27 PROCEDURE — 700102 HCHG RX REV CODE 250 W/ 637 OVERRIDE(OP): Performed by: INTERNAL MEDICINE

## 2018-02-27 PROCEDURE — 770006 HCHG ROOM/CARE - MED/SURG/GYN SEMI*

## 2018-02-27 PROCEDURE — 99233 SBSQ HOSP IP/OBS HIGH 50: CPT | Performed by: INTERNAL MEDICINE

## 2018-02-27 PROCEDURE — 700101 HCHG RX REV CODE 250: Performed by: HOSPITALIST

## 2018-02-27 PROCEDURE — 97162 PT EVAL MOD COMPLEX 30 MIN: CPT

## 2018-02-27 RX ORDER — HYDRALAZINE HYDROCHLORIDE 25 MG/1
50 TABLET, FILM COATED ORAL EVERY 8 HOURS
Status: DISCONTINUED | OUTPATIENT
Start: 2018-02-27 | End: 2018-03-08 | Stop reason: HOSPADM

## 2018-02-27 RX ORDER — PREDNISONE 20 MG/1
40 TABLET ORAL DAILY
Status: DISCONTINUED | OUTPATIENT
Start: 2018-02-28 | End: 2018-02-28

## 2018-02-27 RX ADMIN — SODIUM CHLORIDE: 9 INJECTION, SOLUTION INTRAVENOUS at 07:22

## 2018-02-27 RX ADMIN — HYDRALAZINE HYDROCHLORIDE 50 MG: 25 TABLET, FILM COATED ORAL at 22:09

## 2018-02-27 RX ADMIN — IPRATROPIUM BROMIDE AND ALBUTEROL SULFATE 3 ML: .5; 3 SOLUTION RESPIRATORY (INHALATION) at 10:18

## 2018-02-27 RX ADMIN — METRONIDAZOLE 500 MG: 500 TABLET ORAL at 05:54

## 2018-02-27 RX ADMIN — LABETALOL HYDROCHLORIDE 10 MG: 5 INJECTION, SOLUTION INTRAVENOUS at 04:35

## 2018-02-27 RX ADMIN — HEPARIN SODIUM 5000 UNITS: 5000 INJECTION, SOLUTION INTRAVENOUS; SUBCUTANEOUS at 22:09

## 2018-02-27 RX ADMIN — IPRATROPIUM BROMIDE AND ALBUTEROL SULFATE 3 ML: .5; 3 SOLUTION RESPIRATORY (INHALATION) at 20:59

## 2018-02-27 RX ADMIN — HYDRALAZINE HYDROCHLORIDE 50 MG: 25 TABLET, FILM COATED ORAL at 13:20

## 2018-02-27 RX ADMIN — ALLOPURINOL 100 MG: 100 TABLET ORAL at 08:00

## 2018-02-27 RX ADMIN — DOXYCYCLINE 100 MG: 100 TABLET ORAL at 08:00

## 2018-02-27 RX ADMIN — HEPARIN SODIUM 5000 UNITS: 5000 INJECTION, SOLUTION INTRAVENOUS; SUBCUTANEOUS at 08:00

## 2018-02-27 RX ADMIN — ASPIRIN 81 MG: 81 TABLET, CHEWABLE ORAL at 08:00

## 2018-02-27 RX ADMIN — CETIRIZINE HYDROCHLORIDE 10 MG: 10 TABLET, FILM COATED ORAL at 08:00

## 2018-02-27 RX ADMIN — PREDNISONE 50 MG: 50 TABLET ORAL at 08:00

## 2018-02-27 RX ADMIN — IPRATROPIUM BROMIDE AND ALBUTEROL SULFATE 3 ML: .5; 3 SOLUTION RESPIRATORY (INHALATION) at 15:23

## 2018-02-27 RX ADMIN — METRONIDAZOLE 500 MG: 500 TABLET ORAL at 22:09

## 2018-02-27 RX ADMIN — CEFTRIAXONE 2 G: 2 INJECTION, POWDER, FOR SOLUTION INTRAMUSCULAR; INTRAVENOUS at 08:00

## 2018-02-27 RX ADMIN — DOXYCYCLINE 100 MG: 100 TABLET ORAL at 22:09

## 2018-02-27 RX ADMIN — METRONIDAZOLE 500 MG: 500 TABLET ORAL at 13:20

## 2018-02-27 RX ADMIN — IPRATROPIUM BROMIDE AND ALBUTEROL SULFATE 3 ML: .5; 3 SOLUTION RESPIRATORY (INHALATION) at 06:56

## 2018-02-27 RX ADMIN — FINASTERIDE 5 MG: 5 TABLET, FILM COATED ORAL at 08:00

## 2018-02-27 RX ADMIN — LEVOTHYROXINE SODIUM 150 MCG: 150 TABLET ORAL at 05:54

## 2018-02-27 RX ADMIN — LOSARTAN POTASSIUM 100 MG: 50 TABLET, FILM COATED ORAL at 08:00

## 2018-02-27 ASSESSMENT — GAIT ASSESSMENTS
DEVIATION: DECREASED BASE OF SUPPORT;DECREASED HEEL STRIKE;DECREASED TOE OFF
GAIT LEVEL OF ASSIST: CONTACT GUARD ASSIST
ASSISTIVE DEVICE: FRONT WHEEL WALKER
DISTANCE (FEET): 150

## 2018-02-27 ASSESSMENT — COGNITIVE AND FUNCTIONAL STATUS - GENERAL
MOVING TO AND FROM BED TO CHAIR: A LITTLE
MOVING FROM LYING ON BACK TO SITTING ON SIDE OF FLAT BED: A LITTLE
STANDING UP FROM CHAIR USING ARMS: A LITTLE
TURNING FROM BACK TO SIDE WHILE IN FLAT BAD: A LITTLE
WALKING IN HOSPITAL ROOM: A LITTLE
SUGGESTED CMS G CODE MODIFIER MOBILITY: CK
MOBILITY SCORE: 17
CLIMB 3 TO 5 STEPS WITH RAILING: A LOT

## 2018-02-27 ASSESSMENT — ENCOUNTER SYMPTOMS
WHEEZING: 0
NERVOUS/ANXIOUS: 0
VOMITING: 0
WEAKNESS: 1
DIARRHEA: 0
PHOTOPHOBIA: 0
FALLS: 0
DIZZINESS: 0
BLURRED VISION: 0
DOUBLE VISION: 0
COUGH: 1
ABDOMINAL PAIN: 0
NAUSEA: 0
SHORTNESS OF BREATH: 0
FOCAL WEAKNESS: 0
SPEECH CHANGE: 0
FEVER: 0
PALPITATIONS: 0
NECK PAIN: 0
HEARTBURN: 0
HEADACHES: 0

## 2018-02-27 ASSESSMENT — PAIN SCALES - GENERAL
PAINLEVEL_OUTOF10: 0
PAINLEVEL_OUTOF10: 0

## 2018-02-27 NOTE — PROGRESS NOTES
Received report from night RN.   Assumed care of patient.   Pt AA&Ox 2 - oriented to self and location; however, patient frequently confused.   SANTA as charted.   Denies n/v, n/t, pain.   Medications given per MAR.   Pt up 1x assist with FWW.   No s/sx of infection noted upon assessment.   Condom cath in place.   Pt tolerates a Dysphagia 2, thins diet.   Plan of care discussed.   All questions answered at this time.

## 2018-02-27 NOTE — CONSULTS
"Reason for PC Consult: Advance Care Planning    Assessment:  General:   89yr old male admitted 2/24/18 with weakness and increased coughing; DX-hypoxemic respiratory failure secondary to viral illness with reactive airway disease..  Past medical history of Rectal Cancer (2012), colostomy, [place], severe aortic stenosis, Benign essential hypertension, Grave's disease, pacemaker(2009).  Palliative Care referral for GOC/ACP.  Social- Patient resides in [place] in his own home, alone.  He has a son, Richard, whom he states is his DPOA.  No AD in EMR. Former smoker, + for alcohol occasional scotch.   Dyspnea: Yes- 94% RA- SOB after walk with PT and conversation. Refused O2.  Last BM: 02/27/18- Per RN assessment  Pain: No-  Denies  Depression: Mood appropriate for situation-    Dementia: Not documented    Spiritual:  Is Holiness or spirituality important for coping with this illness? No-    Has a  or spiritual provider visit been requested? No    Palliative Performance Scale: 50%    Advance Directive: None on File  DPOA: None on File- SHONDA Richard Sindy, [phone]  POLST: None on File    Code Status: DNR-      Outcome:  I met with the patient at bedside (S178/1) and introduced myself and the role of Palliative Care in POC.  Patient just finished a walk with PT in the [place].  He is SOB, tired, sitting in a chair at the side of the bed.  Easily awoken, oriented to person, reorients easily.  Patient is able to tell me that he has a son who is DPOA, confirms DNR status and gave permission to contact Richard, son.    PC to Richard, introduced myself and role of Palliative Care.  Richard states that the confusion is baseline for the patient and he does not have any answers from the doctors \"as to why this happened\".  Once he hears from the physician he will be able to discuss plans moving forward.  Richard stated that it \"would be really hard for him to leave his home\". Patient does have [facility] assisting at home. I offered the idea " of Hospice at home with [facility].  Richard stated understanding but unable to consider at this time as he is pending follow up by MD.    Requested a copy of the AD and DNR- Will e-mail when it is found.  He is the only child, NOK.  All questions were answered in full, stated understanding and agreed. Contact for Palliative Care was provided and patient/family is encourage to call for questions, concerns and/or support.      Updated: DG Crowell    Plan: Awaiting testing results before making decisions.      Recommendations: Hospice/Ethics referral is inappropriate at this time as the patient is actively seeking treatment.      Thank you for allowing Palliative Care to participate in this patient's care. Please feel free to call [phone] with any questions or concerns.

## 2018-02-27 NOTE — PROGRESS NOTES
No complaints from previous IV site, no edema. Lungs positive for crackles on inspiration/expiration; RN educated pt to cough/deep breath w/ teach-back from pt. AO to self, can be reoriented but usually forgets after an hour. No dyspnea noted, oral care provided, ABX given.

## 2018-02-27 NOTE — CARE PLAN
Problem: Communication  Goal: The ability to communicate needs accurately and effectively will improve  Pt able to make needs known     Problem: Safety  Goal: Will remain free from injury  Safety precautions in place. Bed locked and in low position. Call light within reach.

## 2018-02-27 NOTE — PROGRESS NOTES
0900 R fa swollen and cool above IV site. IV removed, warm compress applied and extremity elevated.  1800: swelling appears to have decreased, no c/o pain

## 2018-02-27 NOTE — THERAPY
"Physical Therapy Evaluation completed.   Bed Mobility:  Supine to Sit: Minimal Assist  Transfers: Sit to Stand: Contact Guard Assist  Gait: Level Of Assist: Contact Guard Assist with Front-Wheel Walker       Plan of Care: Will benefit from Physical Therapy 3 times per week  Discharge Recommendations: Equipment: Front-Wheel Walker. Post-acute therapy Discharge to a transitional care facility for continued skilled therapy services.    See \"Rehab Therapy-Acute\" Patient Summary Report for complete documentation.     RN notified of PT visit, cleared fo PT evaluation. Pt. presented to PT for primary risk reduction for LOB/falling. Pt. presented with imparied balance, imparied gait, imparied cognition, poor saftey awareness, and decreased activity tolerance. Pt. was able to demonstrate Min A to CGA for all functional mobility at this time w/FWW. Pt. was primarly limited by confusion and poor saftey awareness. Pt. demonstrates with decreased tammie, decreased heel strike, and decreased stride length. Needs VC's for saftey during FWW use and keeping the FWW close during ambulation and transfers. Pt. will benefit from continued skilled PT while in house, with recommendation for post acute therapy services prior to medical d/c home. If pt. is able to have 24/7 supervision at home, pt may be able to d/c home once medically clear.   "

## 2018-02-27 NOTE — THERAPY
"Speech Language Therapy Clinical Swallow Evaluation completed.  Functional Status: The patient was seen for clinical swallow evaluation this date. The patient was awake, alert and oriented to self, location and reason. The patient was noted to have persistent cough before PO trials especially after talking. The patient was given PO trials of ice chips, nectars, purees, thin liquids and solids. The patient presented with intermittent cough however, did not appear related to swallow as it appeared consistent to cough prior to PO trials. Vocal quality remained clear throughout trials. Of note, slight increased WOB was noted following large bites of solids and hard solids which may be due to decreased coordination of breath hold during mastication. At this time, recommend downgrade to D2/thins to ease in mastication and bite size modulation. SLP following.     Recommendations - Diet: : Dysphagia II, Thin Liquid                          Strategies: Monitor during meals and Head of Bed at 90 Degrees                          Medication Administration: Whole with Liquid Wash    Plan of Care: Will benefit from Speech Therapy 3 times per week    Post-Acute Therapy: to be assessed based on progress during acute care.     See \"Rehab Therapy-Acute\" Patient Summary Report for complete documentation.   "

## 2018-02-28 ENCOUNTER — APPOINTMENT (OUTPATIENT)
Dept: RADIOLOGY | Facility: MEDICAL CENTER | Age: 83
DRG: 189 | End: 2018-02-28
Attending: INTERNAL MEDICINE
Payer: MEDICARE

## 2018-02-28 PROBLEM — F03.90 DEMENTIA (HCC): Status: ACTIVE | Noted: 2018-02-28

## 2018-02-28 PROBLEM — E87.20 METABOLIC ACIDOSIS: Status: ACTIVE | Noted: 2018-02-28

## 2018-02-28 LAB
ALBUMIN SERPL BCP-MCNC: 3.5 G/DL (ref 3.2–4.9)
BASOPHILS # BLD AUTO: 0.6 % (ref 0–1.8)
BASOPHILS # BLD: 0.1 K/UL (ref 0–0.12)
BUN SERPL-MCNC: 68 MG/DL (ref 8–22)
CALCIUM SERPL-MCNC: 9.6 MG/DL (ref 8.5–10.5)
CHLORIDE SERPL-SCNC: 108 MMOL/L (ref 96–112)
CO2 SERPL-SCNC: 14 MMOL/L (ref 20–33)
CREAT SERPL-MCNC: 2.19 MG/DL (ref 0.5–1.4)
EOSINOPHIL # BLD AUTO: 0 K/UL (ref 0–0.51)
EOSINOPHIL NFR BLD: 0 % (ref 0–6.9)
ERYTHROCYTE [DISTWIDTH] IN BLOOD BY AUTOMATED COUNT: 46.9 FL (ref 35.9–50)
GLUCOSE SERPL-MCNC: 196 MG/DL (ref 65–99)
HCT VFR BLD AUTO: 43.4 % (ref 42–52)
HGB BLD-MCNC: 14.7 G/DL (ref 14–18)
IMM GRANULOCYTES # BLD AUTO: 0.27 K/UL (ref 0–0.11)
IMM GRANULOCYTES NFR BLD AUTO: 1.5 % (ref 0–0.9)
LYMPHOCYTES # BLD AUTO: 0.89 K/UL (ref 1–4.8)
LYMPHOCYTES NFR BLD: 5 % (ref 22–41)
MAGNESIUM SERPL-MCNC: 2.3 MG/DL (ref 1.5–2.5)
MCH RBC QN AUTO: 31.7 PG (ref 27–33)
MCHC RBC AUTO-ENTMCNC: 33.9 G/DL (ref 33.7–35.3)
MCV RBC AUTO: 93.5 FL (ref 81.4–97.8)
MONOCYTES # BLD AUTO: 0.73 K/UL (ref 0–0.85)
MONOCYTES NFR BLD AUTO: 4.1 % (ref 0–13.4)
NEUTROPHILS # BLD AUTO: 15.71 K/UL (ref 1.82–7.42)
NEUTROPHILS NFR BLD: 88.8 % (ref 44–72)
NRBC # BLD AUTO: 0 K/UL
NRBC BLD-RTO: 0 /100 WBC
PHOSPHATE SERPL-MCNC: 3.8 MG/DL (ref 2.5–4.5)
PLATELET # BLD AUTO: 255 K/UL (ref 164–446)
PMV BLD AUTO: 9.8 FL (ref 9–12.9)
POTASSIUM SERPL-SCNC: 3.8 MMOL/L (ref 3.6–5.5)
RBC # BLD AUTO: 4.64 M/UL (ref 4.7–6.1)
SODIUM SERPL-SCNC: 135 MMOL/L (ref 135–145)
WBC # BLD AUTO: 17.7 K/UL (ref 4.8–10.8)

## 2018-02-28 PROCEDURE — 80069 RENAL FUNCTION PANEL: CPT

## 2018-02-28 PROCEDURE — 99233 SBSQ HOSP IP/OBS HIGH 50: CPT | Performed by: INTERNAL MEDICINE

## 2018-02-28 PROCEDURE — 36415 COLL VENOUS BLD VENIPUNCTURE: CPT

## 2018-02-28 PROCEDURE — 71045 X-RAY EXAM CHEST 1 VIEW: CPT

## 2018-02-28 PROCEDURE — 85025 COMPLETE CBC W/AUTO DIFF WBC: CPT

## 2018-02-28 PROCEDURE — 770006 HCHG ROOM/CARE - MED/SURG/GYN SEMI*

## 2018-02-28 PROCEDURE — 700102 HCHG RX REV CODE 250 W/ 637 OVERRIDE(OP): Performed by: INTERNAL MEDICINE

## 2018-02-28 PROCEDURE — 700111 HCHG RX REV CODE 636 W/ 250 OVERRIDE (IP): Performed by: INTERNAL MEDICINE

## 2018-02-28 PROCEDURE — 76775 US EXAM ABDO BACK WALL LIM: CPT

## 2018-02-28 PROCEDURE — 94640 AIRWAY INHALATION TREATMENT: CPT

## 2018-02-28 PROCEDURE — 700101 HCHG RX REV CODE 250: Performed by: HOSPITALIST

## 2018-02-28 PROCEDURE — A9270 NON-COVERED ITEM OR SERVICE: HCPCS | Performed by: INTERNAL MEDICINE

## 2018-02-28 PROCEDURE — 94760 N-INVAS EAR/PLS OXIMETRY 1: CPT

## 2018-02-28 PROCEDURE — 83735 ASSAY OF MAGNESIUM: CPT

## 2018-02-28 RX ORDER — SODIUM BICARBONATE 650 MG/1
650 TABLET ORAL 3 TIMES DAILY
Status: DISCONTINUED | OUTPATIENT
Start: 2018-02-28 | End: 2018-03-03

## 2018-02-28 RX ORDER — PREDNISONE 20 MG/1
20 TABLET ORAL DAILY
Status: DISCONTINUED | OUTPATIENT
Start: 2018-03-01 | End: 2018-03-03

## 2018-02-28 RX ORDER — IPRATROPIUM BROMIDE AND ALBUTEROL SULFATE 2.5; .5 MG/3ML; MG/3ML
3 SOLUTION RESPIRATORY (INHALATION)
Status: DISCONTINUED | OUTPATIENT
Start: 2018-02-28 | End: 2018-03-08 | Stop reason: HOSPADM

## 2018-02-28 RX ORDER — CARVEDILOL 6.25 MG/1
3.12 TABLET ORAL 2 TIMES DAILY WITH MEALS
Status: DISCONTINUED | OUTPATIENT
Start: 2018-02-28 | End: 2018-03-04

## 2018-02-28 RX ADMIN — METRONIDAZOLE 500 MG: 500 TABLET ORAL at 14:40

## 2018-02-28 RX ADMIN — CETIRIZINE HYDROCHLORIDE 10 MG: 10 TABLET, FILM COATED ORAL at 08:33

## 2018-02-28 RX ADMIN — METRONIDAZOLE 500 MG: 500 TABLET ORAL at 06:25

## 2018-02-28 RX ADMIN — HYDRALAZINE HYDROCHLORIDE 50 MG: 25 TABLET, FILM COATED ORAL at 14:40

## 2018-02-28 RX ADMIN — DOXYCYCLINE 100 MG: 100 TABLET ORAL at 22:32

## 2018-02-28 RX ADMIN — HYDRALAZINE HYDROCHLORIDE 50 MG: 25 TABLET, FILM COATED ORAL at 22:38

## 2018-02-28 RX ADMIN — LEVOTHYROXINE SODIUM 150 MCG: 150 TABLET ORAL at 06:25

## 2018-02-28 RX ADMIN — PREDNISONE 40 MG: 20 TABLET ORAL at 08:33

## 2018-02-28 RX ADMIN — DOXYCYCLINE 100 MG: 100 TABLET ORAL at 08:33

## 2018-02-28 RX ADMIN — ASPIRIN 81 MG: 81 TABLET, CHEWABLE ORAL at 08:33

## 2018-02-28 RX ADMIN — HEPARIN SODIUM 5000 UNITS: 5000 INJECTION, SOLUTION INTRAVENOUS; SUBCUTANEOUS at 08:33

## 2018-02-28 RX ADMIN — LABETALOL HYDROCHLORIDE 10 MG: 5 INJECTION, SOLUTION INTRAVENOUS at 04:52

## 2018-02-28 RX ADMIN — METRONIDAZOLE 500 MG: 500 TABLET ORAL at 22:32

## 2018-02-28 RX ADMIN — CARVEDILOL 3.12 MG: 6.25 TABLET, FILM COATED ORAL at 16:47

## 2018-02-28 RX ADMIN — IPRATROPIUM BROMIDE AND ALBUTEROL SULFATE 3 ML: .5; 3 SOLUTION RESPIRATORY (INHALATION) at 07:30

## 2018-02-28 RX ADMIN — ALLOPURINOL 100 MG: 100 TABLET ORAL at 08:33

## 2018-02-28 RX ADMIN — HYDRALAZINE HYDROCHLORIDE 50 MG: 25 TABLET, FILM COATED ORAL at 06:25

## 2018-02-28 RX ADMIN — FINASTERIDE 5 MG: 5 TABLET, FILM COATED ORAL at 08:33

## 2018-02-28 RX ADMIN — CEFTRIAXONE 2 G: 2 INJECTION, POWDER, FOR SOLUTION INTRAMUSCULAR; INTRAVENOUS at 08:34

## 2018-02-28 RX ADMIN — IPRATROPIUM BROMIDE AND ALBUTEROL SULFATE 3 ML: .5; 3 SOLUTION RESPIRATORY (INHALATION) at 10:54

## 2018-02-28 RX ADMIN — HEPARIN SODIUM 5000 UNITS: 5000 INJECTION, SOLUTION INTRAVENOUS; SUBCUTANEOUS at 22:32

## 2018-02-28 RX ADMIN — SODIUM BICARBONATE 650 MG: 650 TABLET ORAL at 22:32

## 2018-02-28 RX ADMIN — CARVEDILOL 3.12 MG: 6.25 TABLET, FILM COATED ORAL at 11:11

## 2018-02-28 RX ADMIN — SODIUM BICARBONATE 650 MG: 650 TABLET ORAL at 14:40

## 2018-02-28 ASSESSMENT — ENCOUNTER SYMPTOMS
FOCAL WEAKNESS: 0
DOUBLE VISION: 0
SPEECH CHANGE: 0
PHOTOPHOBIA: 0
NERVOUS/ANXIOUS: 0
FEVER: 0
SHORTNESS OF BREATH: 0
ABDOMINAL PAIN: 0
HEADACHES: 0
WHEEZING: 0
DIZZINESS: 0
WEAKNESS: 1
NAUSEA: 0
COUGH: 1
NECK PAIN: 0
HEARTBURN: 0
PALPITATIONS: 0
BLURRED VISION: 0
FALLS: 0
VOMITING: 0
DIARRHEA: 0

## 2018-02-28 ASSESSMENT — PAIN SCALES - GENERAL
PAINLEVEL_OUTOF10: 0

## 2018-02-28 NOTE — CARE PLAN
Problem: Bronchoconstriction:  Goal: Improve in air movement and diminished wheezing  Outcome: PROGRESSING AS EXPECTED  Respiratory Therapy Update    Interdisciplinary Plan of Care-Goals (Indications)  Obstructive Ventilatory Defect or Pulmonary Disease without Obvious Obstruction: Physical Exam / Hyperinflation / Wheezing (bronchospasm) (02/27/18 0659)  Interdisciplinary Plan of Care-Outcomes   Bronchodilator Outcome: Patient at Stable Baseline (02/27/18 0659)          #SVN Performed: Yes (02/27/18 1525)    Cough: Non Productive (02/27/18 1525)  Sputum Amount: Unable to Evaluate (02/26/18 0633)  Sputum Color: Unable to Evaluate (02/25/18 0430)  Sputum Consistency: Unable to Evaluate (02/24/18 1645)               O2 (FiO2): 21 (02/27/18 1525)  O2 (LPM): 0 (02/27/18 1525)  O2 Daily Delivery Respiratory : Room Air with O2 Available (02/27/18 1525)    Breath Sounds  Pre/Post Intervention: Post Intervention Assessment (02/27/18 1525)  RUL Breath Sounds: Diminished (02/27/18 1525)  RML Breath Sounds: Diminished (02/27/18 1525)  RLL Breath Sounds: Diminished (02/27/18 1525)  ETHAN Breath Sounds: Diminished (02/27/18 1525)  LLL Breath Sounds: Diminished (02/27/18 1525)

## 2018-02-28 NOTE — PROGRESS NOTES
Pt pulled off condom cath and pulled IV trying to go to the bathroom. Bed alarm was on, pt made it to bathroom and back to bed safely. RN reeducated pt on using call light for BR, reinforcement needed.

## 2018-02-28 NOTE — DISCHARGE PLANNING
Medical Social Work    Referral: F/U     Intervention: Discussed pt's case in morning huddle. Palliative Consult has consulted and spoke to pt's NOK son Richard Callahan #[phone]. At this time, son is anticipating f/u with MD and await test results prior to making any decisions. Pt is currently DNR.     Plan: As above, d/c needs undetermined at this time. Will discuss pt's case further in MDT rounds this afternoon.

## 2018-02-28 NOTE — PROGRESS NOTES
[facility] Hospitalist Progress Note    Date of Service: 2018    Chief Complaint  89 y.o. male admitted 2018 with cough x 1 week and weakness.     Interval Problem Update  18:  The patient is awake and alert and eating his lunch during my examination.  The patient states that he is getting better.     Consultants/Specialty  None.    Disposition  Undetermined at this time        Review of Systems   Constitutional: Negative for fever.   HENT: Positive for hearing loss. Negative for congestion.    Eyes: Negative for blurred vision, double vision and photophobia.   Respiratory: Positive for cough. Negative for shortness of breath and wheezing.    Cardiovascular: Negative for chest pain and palpitations.   Gastrointestinal: Negative for abdominal pain, diarrhea, heartburn, nausea and vomiting.   Musculoskeletal: Negative for falls and neck pain.   Neurological: Positive for weakness. Negative for dizziness, speech change, focal weakness and headaches.   Psychiatric/Behavioral: The patient is not nervous/anxious.       Physical Exam  Laboratory/Imaging   Hemodynamics  Temp (24hrs), Av.6 °C (97.8 °F), Min:36.3 °C (97.4 °F), Max:36.9 °C (98.4 °F)   Temperature: 36.4 °C (97.6 °F)  Pulse  Av.2  Min: 52  Max: 122 Heart Rate (Monitored): 71  Blood Pressure : 153/79      Respiratory      Respiration: 18, Pulse Oximetry: 94 %, O2 Daily Delivery Respiratory : Room Air with O2 Available     Given By:: Mask, Work Of Breathing / Effort: Mild  RUL Breath Sounds: Diminished, RML Breath Sounds: Diminished, RLL Breath Sounds: Diminished, ETHAN Breath Sounds: Diminished, LLL Breath Sounds: Diminished    Fluids    Intake/Output Summary (Last 24 hours) at 18 1706  Last data filed at 18 1600   Gross per 24 hour   Intake                0 ml   Output              950 ml   Net             -950 ml       Nutrition  Orders Placed This Encounter   Procedures   • Diet Order     Standing Status:   Standing     Number of  Occurrences:   1     Order Specific Question:   Diet:     Answer:   Regular [1]     Order Specific Question:   Texture/Fiber modifications:     Answer:   Dysphagia 2(Pureed/Chopped)specify fluid consistency(question 6) [2]     Order Specific Question:   Consistency/Fluid modifications:     Answer:   Thin Liquids [3]     Physical Exam   Constitutional: Vital signs are normal. He is cooperative. He has a sickly appearance. No distress.   HENT:   Head: Normocephalic.   Right Ear: Hearing normal.   Left Ear: Hearing normal.   Nose: Nose normal.   Mouth/Throat: Oropharynx is clear and moist and mucous membranes are normal.   Eyes: Lids are normal. Scleral icterus is present.   Neck: Phonation normal. No JVD present.   Cardiovascular: Regular rhythm and intact distal pulses.    Murmur heard.  Pulmonary/Chest: Effort normal and breath sounds normal. He has no wheezes. He has no rales.   Neurological: He is alert.   Skin: He is not diaphoretic.   Nursing note and vitals reviewed.      Recent Labs      02/25/18 0127 02/26/18 0407  02/27/18   0444   WBC  9.9  16.4*  20.3*   RBC  4.33*  4.13*  4.78   HEMOGLOBIN  13.2*  12.9*  15.1   HEMATOCRIT  39.9*  37.2*  43.2   MCV  92.1  90.1  90.4   MCH  30.5  31.2  31.6   MCHC  33.1*  34.7  35.0   RDW  46.5  44.4  44.6   PLATELETCT  185  210  302   MPV  9.7  10.1  9.9     Recent Labs      02/25/18 0127 02/26/18   0407  02/27/18   0444   SODIUM  135  134*  134*   POTASSIUM  4.2  3.8  3.7   CHLORIDE  102  103  102   CO2  19*  18*  17*   GLUCOSE  238*  165*  199*   BUN  36*  62*  71*   CREATININE  1.70*  2.56*  2.50*   CALCIUM  9.1  9.0  9.5         Recent Labs      02/25/18   0127   BNPBTYPENAT  126*              Assessment/Plan     Acute renal failure superimposed on stage 3 chronic kidney disease (CMS-HCC)- (present on admission)   Assessment & Plan    - renal function is much worse than baseline.  -- check renal ultrasound.  -- check uric acid and ck level.  -- continue iv  fluid hydration.  - avoid nephrotoxins  - renally dose medications as necessary  -- renal renal function panel in morning.  -- hold losartan and switch to alternative antihypertensive therapy.        Acute hypoxemic respiratory failure (CMS-HCC)- (present on admission)   Assessment & Plan    -- likely due acute bronchitis.  - elevated lactic acid  - wean off supplemental oxygen.  - steroids  - audible upper airway congestion  - pulmonary toilet - IS/Cough/DB  - RT protocol - Nebs        Acute bronchitis- (present on admission)   Assessment & Plan    Clinically patient appears to have acute bronchitis 2/2 to underlying viral illness c/b RAD  Low suspicion for underlying pneumonia, if present concern for aspiration pneumonia given advanced age.  Diet modify per speech therapy. Dysphagia II, Thin Liquid.  Repeat CXR in morning.  At this point initiate patient on IV ceftriaxone, Flagyl and doxycycline  Obtain speech therapy evaluation  De-escalate antibiotics as clinically appropriate        DNR (do not resuscitate)- (present on admission)   Assessment & Plan    - Patient speaks about [place] time [place] getting [place]   - Palliative care consult today        Lactic acidosis- (present on admission)   Assessment & Plan    - no fevers  - empiric IV ABX  - BC negative to date  -- repeat level to monitor.   -- continue iv fluid for hydration.        Leukocytosis- (present on admission)   Assessment & Plan    - likely from steroids.  Taper steroid dosage gradually.  - no fevers  - am CBC        Reactive airway disease- (present on admission)   Assessment & Plan    - possibly due to URI  - steroids  -wean off supplemental O2   - IV ABX  - RT protocol  - Nebs/inhalers          Aortic stenosis- (present on admission)   Assessment & Plan    - last ECHO on file here was June 2009: showed EF 65% with diastolic dysfunction and mild aortic stenosis and mild tricuspid regurg  - ECHO today showed mild aortic stenosis with EF  60%              Gout- (present on admission)   Assessment & Plan    Allopurinol        HTN (hypertension)- (present on admission)   Assessment & Plan    Losartan to continue  Holding HCTZ, resume as clinically appropriate        Hypothyroidism- (present on admission)   Assessment & Plan    - chronic on Levothyroxine        History of rectal cancer- (present on admission)   Assessment & Plan    - with presence of colostomy        SSS (sick sinus syndrome) (CMS-HCC)- (present on admission)   Assessment & Plan    - history of  - presence of ACID/PPM        Esophageal reflux- (present on admission)   Assessment & Plan    Holding PPI to limit risk of C. diff with empiric antibiotic usage        Benign prostatic hypertrophy- (present on admission)   Assessment & Plan    Continue finasteride          Quality-Core Measures   DVT prophylaxis - mechanical:  SCDs      Marcus Webb D.O.

## 2018-02-28 NOTE — PROGRESS NOTES
Received report from night RN.   Assumed care of patient.   Pt AA&Ox1 - oriented to self only.   SANTA as charted.   Denies n/v, n/t, pain.   Pt with flat affect at times.   Pt states not being able to eat.   Expresses desires to leave.   Medications given per MAR.   Pt up 1x assist with FWW.   No s/sx of infection noted upon assessment.   Plan of care discussed.   All questions answered at this time.

## 2018-02-28 NOTE — CARE PLAN
Problem: Infection  Goal: Will remain free from infection  No s/sx of infection noted upon assessment.     Problem: Skin Integrity  Goal: Risk for impaired skin integrity will decrease  Pt able to move self side to side.

## 2018-02-28 NOTE — CARE PLAN
Problem: Bronchoconstriction:  Goal: Improve in air movement and diminished wheezing  Outcome: PROGRESSING AS EXPECTED    Intervention: Implement inhaled treatments  Continue with bronchodilators as indicated.

## 2018-03-01 PROBLEM — J96.01 ACUTE HYPOXEMIC RESPIRATORY FAILURE (HCC): Status: RESOLVED | Noted: 2018-02-24 | Resolved: 2018-03-01

## 2018-03-01 LAB
ALBUMIN SERPL BCP-MCNC: 3.5 G/DL (ref 3.2–4.9)
BACTERIA BLD CULT: NORMAL
BACTERIA BLD CULT: NORMAL
BACTERIA UR CULT: NORMAL
BASOPHILS # BLD AUTO: 0.3 % (ref 0–1.8)
BASOPHILS # BLD: 0.03 K/UL (ref 0–0.12)
BUN SERPL-MCNC: 55 MG/DL (ref 8–22)
CALCIUM SERPL-MCNC: 9.1 MG/DL (ref 8.5–10.5)
CHLORIDE SERPL-SCNC: 112 MMOL/L (ref 96–112)
CO2 SERPL-SCNC: 17 MMOL/L (ref 20–33)
CREAT SERPL-MCNC: 1.72 MG/DL (ref 0.5–1.4)
EOSINOPHIL # BLD AUTO: 0 K/UL (ref 0–0.51)
EOSINOPHIL NFR BLD: 0 % (ref 0–6.9)
ERYTHROCYTE [DISTWIDTH] IN BLOOD BY AUTOMATED COUNT: 45.6 FL (ref 35.9–50)
GLUCOSE SERPL-MCNC: 161 MG/DL (ref 65–99)
HCT VFR BLD AUTO: 40.9 % (ref 42–52)
HGB BLD-MCNC: 13.9 G/DL (ref 14–18)
IMM GRANULOCYTES # BLD AUTO: 0.37 K/UL (ref 0–0.11)
IMM GRANULOCYTES NFR BLD AUTO: 3.1 % (ref 0–0.9)
LYMPHOCYTES # BLD AUTO: 1.06 K/UL (ref 1–4.8)
LYMPHOCYTES NFR BLD: 8.9 % (ref 22–41)
MAGNESIUM SERPL-MCNC: 2 MG/DL (ref 1.5–2.5)
MCH RBC QN AUTO: 30.6 PG (ref 27–33)
MCHC RBC AUTO-ENTMCNC: 34 G/DL (ref 33.7–35.3)
MCV RBC AUTO: 90.1 FL (ref 81.4–97.8)
MONOCYTES # BLD AUTO: 0.54 K/UL (ref 0–0.85)
MONOCYTES NFR BLD AUTO: 4.5 % (ref 0–13.4)
NEUTROPHILS # BLD AUTO: 9.91 K/UL (ref 1.82–7.42)
NEUTROPHILS NFR BLD: 83.2 % (ref 44–72)
NRBC # BLD AUTO: 0 K/UL
NRBC BLD-RTO: 0 /100 WBC
PHOSPHATE SERPL-MCNC: 3.5 MG/DL (ref 2.5–4.5)
PLATELET # BLD AUTO: 259 K/UL (ref 164–446)
PMV BLD AUTO: 9.8 FL (ref 9–12.9)
POTASSIUM SERPL-SCNC: 4.6 MMOL/L (ref 3.6–5.5)
RBC # BLD AUTO: 4.54 M/UL (ref 4.7–6.1)
SIGNIFICANT IND 70042: NORMAL
SITE SITE: NORMAL
SODIUM SERPL-SCNC: 142 MMOL/L (ref 135–145)
SOURCE SOURCE: NORMAL
WBC # BLD AUTO: 11.9 K/UL (ref 4.8–10.8)

## 2018-03-01 PROCEDURE — 80069 RENAL FUNCTION PANEL: CPT

## 2018-03-01 PROCEDURE — 700102 HCHG RX REV CODE 250 W/ 637 OVERRIDE(OP): Performed by: INTERNAL MEDICINE

## 2018-03-01 PROCEDURE — 85025 COMPLETE CBC W/AUTO DIFF WBC: CPT

## 2018-03-01 PROCEDURE — 700111 HCHG RX REV CODE 636 W/ 250 OVERRIDE (IP): Performed by: INTERNAL MEDICINE

## 2018-03-01 PROCEDURE — 36415 COLL VENOUS BLD VENIPUNCTURE: CPT

## 2018-03-01 PROCEDURE — 99233 SBSQ HOSP IP/OBS HIGH 50: CPT | Performed by: INTERNAL MEDICINE

## 2018-03-01 PROCEDURE — A9270 NON-COVERED ITEM OR SERVICE: HCPCS | Performed by: INTERNAL MEDICINE

## 2018-03-01 PROCEDURE — 770006 HCHG ROOM/CARE - MED/SURG/GYN SEMI*

## 2018-03-01 PROCEDURE — 83735 ASSAY OF MAGNESIUM: CPT

## 2018-03-01 RX ADMIN — METRONIDAZOLE 500 MG: 500 TABLET ORAL at 22:10

## 2018-03-01 RX ADMIN — ASPIRIN 81 MG: 81 TABLET, CHEWABLE ORAL at 07:58

## 2018-03-01 RX ADMIN — FINASTERIDE 5 MG: 5 TABLET, FILM COATED ORAL at 07:58

## 2018-03-01 RX ADMIN — HYDRALAZINE HYDROCHLORIDE 50 MG: 25 TABLET, FILM COATED ORAL at 22:09

## 2018-03-01 RX ADMIN — CARVEDILOL 3.12 MG: 6.25 TABLET, FILM COATED ORAL at 07:58

## 2018-03-01 RX ADMIN — METRONIDAZOLE 500 MG: 500 TABLET ORAL at 14:09

## 2018-03-01 RX ADMIN — CARVEDILOL 3.12 MG: 6.25 TABLET, FILM COATED ORAL at 17:07

## 2018-03-01 RX ADMIN — ACETAMINOPHEN 650 MG: 325 TABLET, FILM COATED ORAL at 22:09

## 2018-03-01 RX ADMIN — HYDRALAZINE HYDROCHLORIDE 50 MG: 25 TABLET, FILM COATED ORAL at 05:29

## 2018-03-01 RX ADMIN — HEPARIN SODIUM 5000 UNITS: 5000 INJECTION, SOLUTION INTRAVENOUS; SUBCUTANEOUS at 22:10

## 2018-03-01 RX ADMIN — HEPARIN SODIUM 5000 UNITS: 5000 INJECTION, SOLUTION INTRAVENOUS; SUBCUTANEOUS at 07:59

## 2018-03-01 RX ADMIN — HYDRALAZINE HYDROCHLORIDE 50 MG: 25 TABLET, FILM COATED ORAL at 14:09

## 2018-03-01 RX ADMIN — ALLOPURINOL 100 MG: 100 TABLET ORAL at 07:58

## 2018-03-01 RX ADMIN — CEFTRIAXONE 2 G: 2 INJECTION, POWDER, FOR SOLUTION INTRAMUSCULAR; INTRAVENOUS at 08:04

## 2018-03-01 RX ADMIN — DOXYCYCLINE 100 MG: 100 TABLET ORAL at 22:09

## 2018-03-01 RX ADMIN — SODIUM BICARBONATE 650 MG: 650 TABLET ORAL at 07:57

## 2018-03-01 RX ADMIN — LEVOTHYROXINE SODIUM 150 MCG: 150 TABLET ORAL at 05:29

## 2018-03-01 RX ADMIN — CETIRIZINE HYDROCHLORIDE 10 MG: 10 TABLET, FILM COATED ORAL at 07:58

## 2018-03-01 RX ADMIN — SODIUM BICARBONATE 650 MG: 650 TABLET ORAL at 14:09

## 2018-03-01 RX ADMIN — SODIUM BICARBONATE 650 MG: 650 TABLET ORAL at 22:10

## 2018-03-01 RX ADMIN — PREDNISONE 20 MG: 20 TABLET ORAL at 07:58

## 2018-03-01 RX ADMIN — DOXYCYCLINE 100 MG: 100 TABLET ORAL at 07:58

## 2018-03-01 RX ADMIN — METRONIDAZOLE 500 MG: 500 TABLET ORAL at 05:29

## 2018-03-01 ASSESSMENT — ENCOUNTER SYMPTOMS
DIZZINESS: 0
BLURRED VISION: 0
HEARTBURN: 0
WHEEZING: 0
PHOTOPHOBIA: 0
DOUBLE VISION: 0
ABDOMINAL PAIN: 0
DIARRHEA: 0
NERVOUS/ANXIOUS: 0
FALLS: 0
FOCAL WEAKNESS: 0
SPEECH CHANGE: 0
COUGH: 1
NECK PAIN: 0
WEAKNESS: 1
FEVER: 0
VOMITING: 0
HEADACHES: 0
NAUSEA: 0
PALPITATIONS: 0
SHORTNESS OF BREATH: 0

## 2018-03-01 ASSESSMENT — PAIN SCALES - GENERAL
PAINLEVEL_OUTOF10: 3
PAINLEVEL_OUTOF10: 0

## 2018-03-01 NOTE — PROGRESS NOTES
Assumed pt care at 1900. Pt alert and oriented X 1. Bed alarm on and call light and personal belongings within reach. Pt denies chest pain, sob, numbness and tingling, nausea and vomiting, headache, and blurry or double vision. Pt denies pain. Pt on room air with  in place. POC discussed and education provided on administered medications, no evidence of learning. All questions and concerns addressed. Fall precautions, hourly rounding and Q4 hour neuro checks in place.

## 2018-03-01 NOTE — PROGRESS NOTES
Spoke with Dr. Webb regarding pt dc plan. Spoke with Renuka MARCIAL as well regarding DC plan. Called Shane Ramos- [phone] and left massage as he did not answer. Attempting to verify if someone lives with pt so pt can DC home with HH. Will continue to attempt to contact son.

## 2018-03-01 NOTE — DISCHARGE PLANNING
Medical Social Work    MATEO reviewed pt's chart prior to [facility] and notes that a choice form was sent to son for SNF placement yesterday. MATEO called pt's son, Richard, and inquired as to his choice for SNF, but iRchard state he has not yet looked at the choices. Richard reports he will not be agreeable to sending his father to either [facility], but also did not accept SW's offer at this time to send a blanket referral minus  to see who would be willing to accept. Richard agreed he will look at it today and will call MATEO. MATEO advised Richard if she does not hear from him by this afternoon that she will call to f/u.

## 2018-03-01 NOTE — PROGRESS NOTES
[facility] Hospitalist Progress Note    Date of Service: 2018    Chief Complaint  89 y.o. male admitted 2018 with cough x 1 week and weakness.     Interval Problem Update  18:  The patient is awake and alert and eating his lunch during my examination.  The patient states that he is getting better.     18:  The patient appears in happy mood.  Caregiver at bedside.    Consultants/Specialty  None.    Disposition  SNF versus home with home health.  Anticipate discharge tomorrow if the patient's condition continues to improve.      Review of Systems   Constitutional: Negative for fever.   HENT: Positive for hearing loss. Negative for congestion.    Eyes: Negative for blurred vision, double vision and photophobia.   Respiratory: Positive for cough. Negative for shortness of breath and wheezing.    Cardiovascular: Negative for chest pain and palpitations.   Gastrointestinal: Negative for abdominal pain, diarrhea, heartburn, nausea and vomiting.   Musculoskeletal: Negative for falls and neck pain.   Neurological: Positive for weakness. Negative for dizziness, speech change, focal weakness and headaches.   Psychiatric/Behavioral: The patient is not nervous/anxious.       Physical Exam  Laboratory/Imaging   Hemodynamics  Temp (24hrs), Av.2 °C (97.1 °F), Min:35.9 °C (96.6 °F), Max:36.4 °C (97.6 °F)   Temperature: 36.1 °C (97 °F)  Pulse  Av.4  Min: 52  Max: 122 Heart Rate (Monitored): 74  Blood Pressure : 137/76      Respiratory      Respiration: 18, Pulse Oximetry: 94 %, O2 Daily Delivery Respiratory : Room Air with O2 Available     Given By:: Mask, Work Of Breathing / Effort: Mild  RUL Breath Sounds: Clear, RML Breath Sounds: Clear, RLL Breath Sounds: Diminished, ETHAN Breath Sounds: Rhonchi, LLL Breath Sounds: Diminished    Fluids    Intake/Output Summary (Last 24 hours) at 18 2110  Last data filed at 18 1800   Gross per 24 hour   Intake              350 ml   Output             1300 ml    Net             -950 ml       Nutrition  Orders Placed This Encounter   Procedures   • Diet Order     Standing Status:   Standing     Number of Occurrences:   1     Order Specific Question:   Diet:     Answer:   Regular [1]     Order Specific Question:   Texture/Fiber modifications:     Answer:   Dysphagia 2(Pureed/Chopped)specify fluid consistency(question 6) [2]     Order Specific Question:   Consistency/Fluid modifications:     Answer:   Thin Liquids [3]     Physical Exam   Constitutional: Vital signs are normal. He is cooperative. He has a sickly appearance. No distress.   HENT:   Head: Normocephalic.   Right Ear: Hearing normal.   Left Ear: Hearing normal.   Nose: Nose normal.   Mouth/Throat: Oropharynx is clear and moist and mucous membranes are normal.   Eyes: EOM and lids are normal. Right eye exhibits no discharge. Left eye exhibits no discharge. No scleral icterus.   Neck: Phonation normal. No JVD present.   Cardiovascular: Regular rhythm and intact distal pulses.    Murmur heard.  Pulmonary/Chest: Effort normal and breath sounds normal. He has no wheezes. He has no rales.   Neurological: He is alert.   Skin: He is not diaphoretic.   Nursing note and vitals reviewed.      Recent Labs      02/26/18   0407  02/27/18   0444  02/28/18   0326   WBC  16.4*  20.3*  17.7*   RBC  4.13*  4.78  4.64*   HEMOGLOBIN  12.9*  15.1  14.7   HEMATOCRIT  37.2*  43.2  43.4   MCV  90.1  90.4  93.5   MCH  31.2  31.6  31.7   MCHC  34.7  35.0  33.9   RDW  44.4  44.6  46.9   PLATELETCT  210  302  255   MPV  10.1  9.9  9.8     Recent Labs      02/26/18   0407  02/27/18   0444  02/28/18   0326   SODIUM  134*  134*  135   POTASSIUM  3.8  3.7  3.8   CHLORIDE  103  102  108   CO2  18*  17*  14*   GLUCOSE  165*  199*  196*   BUN  62*  71*  68*   CREATININE  2.56*  2.50*  2.19*   CALCIUM  9.0  9.5  9.6                      Assessment/Plan     Acute renal failure superimposed on stage 3 chronic kidney disease (CMS-HCC)- (present on  admission)   Assessment & Plan    - renal function is worse than baseline.  -- renal ultrasound negative for hydronephrosis.  -- normal uric acid and ck level.  -- renal function improving. continue iv fluid hydration.  - avoid nephrotoxins  - renally dose medications as necessary  -- repeat renal function panel in morning.  -- hold losartan and switch to alternative antihypertensive therapy.        Acute hypoxemic respiratory failure (CMS-HCC)- (present on admission)   Assessment & Plan    -- likely due acute bronchitis.  - elevated lactic acid  - wean off supplemental oxygen.  - steroids  - audible upper airway congestion  - pulmonary toilet - IS/Cough/DB  - RT protocol - Nebs        Acute bronchitis- (present on admission)   Assessment & Plan    Clinically patient appears to have acute bronchitis 2/2 to underlying viral illness c/b RAD  Low suspicion for underlying pneumonia, if present concern for aspiration pneumonia given advanced age.  Diet modify per speech therapy. Dysphagia II, Thin Liquid.  Repeat CXR result is pending.  At this point initiate patient on IV ceftriaxone, Flagyl and doxycycline  De-escalate antibiotics as clinically appropriate        DNR (do not resuscitate)- (present on admission)   Assessment & Plan    - Patient speaks about [place] time [place] getting shorter   - Palliative care consult today        Lactic acidosis- (present on admission)   Assessment & Plan    - no fevers  - empiric IV ABX  - BC negative to date  -- repeat level to monitor.   -- continue iv fluid for hydration.        Leukocytosis- (present on admission)   Assessment & Plan    - likely from steroids.  Taper steroid dosage gradually.  - no fevers  - am CBC        Reactive airway disease- (present on admission)   Assessment & Plan    - possibly due to URI  - taper steroids  -wean off supplemental O2   - IV ABX  - RT protocol  - Nebs/inhalers          Aortic stenosis- (present on admission)   Assessment & Plan    - last  ECHO on file here was June 2009: showed EF 65% with diastolic dysfunction and mild aortic stenosis and mild tricuspid regurg  - ECHO today showed mild aortic stenosis with EF 60%              Metabolic acidosis   Assessment & Plan    Lactic acidosis present.  Continue iv fluid.  Also maybe due to renal dysfunction.  Add sodium bicarb and monitor.        Gout- (present on admission)   Assessment & Plan    Allopurinol        HTN (hypertension)- (present on admission)   Assessment & Plan    Hold Losartan and HCTZ   resume as clinically appropriate    Continue hydralazine.  BP still high.  Thus, add carvedilol.        Hypothyroidism- (present on admission)   Assessment & Plan    - chronic on Levothyroxine        History of rectal cancer- (present on admission)   Assessment & Plan    - with presence of colostomy        SSS (sick sinus syndrome) (CMS-HCC)- (present on admission)   Assessment & Plan    - history of  - presence of ACID/PPM        Esophageal reflux- (present on admission)   Assessment & Plan    Holding PPI to limit risk of C. diff with empiric antibiotic usage        Benign prostatic hypertrophy- (present on admission)   Assessment & Plan    Continue finasteride          Quality-Core Measures   DVT prophylaxis - mechanical:  SCDs      Marcus Webb D.O.

## 2018-03-01 NOTE — PROGRESS NOTES
[facility] Hospitalist Progress Note    Date of Service: 3/1/2018    Chief Complaint  89 y.o. male admitted 2018 with cough x 1 week and weakness.     Interval Problem Update  18:  The patient is awake and alert and eating his lunch during my examination.  The patient states that he is getting better.     18:  The patient appears in happy mood.  Caregiver at bedside.    3/1/18:  No acute event overnight.  The patient is sitting comfortably in chair and eating his meal.  Attempted to call the patient's son multiple times but no answer.    Consultants/Specialty  None.    Disposition  SNF versus home with home health.  Anticipate discharge tomorrow if the patient's condition continues to improve.      Review of Systems   Constitutional: Negative for fever.   HENT: Positive for hearing loss. Negative for congestion.    Eyes: Negative for blurred vision, double vision and photophobia.   Respiratory: Positive for cough. Negative for shortness of breath and wheezing.    Cardiovascular: Negative for chest pain and palpitations.   Gastrointestinal: Negative for abdominal pain, diarrhea, heartburn, nausea and vomiting.   Musculoskeletal: Negative for falls and neck pain.   Neurological: Positive for weakness. Negative for dizziness, speech change, focal weakness and headaches.   Psychiatric/Behavioral: The patient is not nervous/anxious.       Physical Exam  Laboratory/Imaging   Hemodynamics  Temp (24hrs), Av.3 °C (97.3 °F), Min:36.1 °C (97 °F), Max:36.4 °C (97.6 °F)   Temperature: 36.2 °C (97.1 °F)  Pulse  Av.4  Min: 52  Max: 122 Heart Rate (Monitored): 74  Blood Pressure : 151/82      Respiratory      Respiration: 18, Pulse Oximetry: 94 %, O2 Daily Delivery Respiratory : Room Air with O2 Available     Given By:: Mask, Work Of Breathing / Effort: Mild  RUL Breath Sounds: Clear, RML Breath Sounds: Clear, RLL Breath Sounds: Diminished, ETHAN Breath Sounds: Rhonchi, LLL Breath Sounds:  Diminished    Fluids    Intake/Output Summary (Last 24 hours) at 03/01/18 0840  Last data filed at 03/01/18 0500   Gross per 24 hour   Intake              200 ml   Output             1500 ml   Net            -1300 ml       Nutrition  Orders Placed This Encounter   Procedures   • Diet Order     Standing Status:   Standing     Number of Occurrences:   1     Order Specific Question:   Diet:     Answer:   Regular [1]     Order Specific Question:   Texture/Fiber modifications:     Answer:   Dysphagia 2(Pureed/Chopped)specify fluid consistency(question 6) [2]     Order Specific Question:   Consistency/Fluid modifications:     Answer:   Thin Liquids [3]     Physical Exam   Constitutional: Vital signs are normal. He is cooperative. No distress.   Frail elderly   HENT:   Head: Normocephalic.   Right Ear: Hearing normal.   Left Ear: Hearing normal.   Nose: Nose normal.   Mouth/Throat: Oropharynx is clear and moist and mucous membranes are normal.   Eyes: EOM and lids are normal. Right eye exhibits no discharge. Left eye exhibits no discharge. No scleral icterus.   Neck: Phonation normal. No JVD present.   Cardiovascular: Regular rhythm and intact distal pulses.    Murmur heard.  Pulmonary/Chest: Effort normal. No respiratory distress. He has no wheezes.   Diminished breath sounds in lung bases.   Neurological: He is alert.   Skin: He is not diaphoretic.   Nursing note and vitals reviewed.      Recent Labs      02/27/18   0444  02/28/18   0326  03/01/18 0224   WBC  20.3*  17.7*  11.9*   RBC  4.78  4.64*  4.54*   HEMOGLOBIN  15.1  14.7  13.9*   HEMATOCRIT  43.2  43.4  40.9*   MCV  90.4  93.5  90.1   MCH  31.6  31.7  30.6   MCHC  35.0  33.9  34.0   RDW  44.6  46.9  45.6   PLATELETCT  302  255  259   MPV  9.9  9.8  9.8     Recent Labs      02/27/18   0444  02/28/18   0326  03/01/18   0224   SODIUM  134*  135  142   POTASSIUM  3.7  3.8  4.6   CHLORIDE  102  108  112   CO2  17*  14*  17*   GLUCOSE  199*  196*  161*   BUN  71*   68*  55*   CREATININE  2.50*  2.19*  1.72*   CALCIUM  9.5  9.6  9.1                      Assessment/Plan     Acute renal failure superimposed on stage 3 chronic kidney disease (CMS-HCC)- (present on admission)   Assessment & Plan    - renal function is improving gradually toward baseline.  -- renal ultrasound negative for hydronephrosis.  -- normal uric acid and ck level.  - avoid nephrotoxins  - renally dose medications as necessary  -- stop iv hydration as the patient has diminished breath sounds in bilateral lung bases and the patient's last chest xray indicate possible vascular congestion.  -- repeat renal function panel in morning.  -- hold losartan and switch to alternative antihypertensive therapy.        Acute bronchitis- (present on admission)   Assessment & Plan    Clinically patient appears to have acute bronchitis 2/2 to underlying viral illness c/b RAD  Diet modify per speech therapy. Dysphagia II, Thin Liquid.  Repeat CXR result is pending.  Continue IV antibiotic for 7 days total.  Check procalcitonin level.        DNR (do not resuscitate)- (present on admission)   Assessment & Plan    - Patient speaks about his time [place] getting [place]   - Palliative care consult today        Lactic acidosis- (present on admission)   Assessment & Plan    - no fevers  - empiric IV ABX  - BC negative to date  -- repeat level to monitor.   -- continue iv fluid for hydration.        Leukocytosis- (present on admission)   Assessment & Plan    - likely from steroids.  Taper steroid dosage gradually.  - no fevers  - am CBC        Reactive airway disease- (present on admission)   Assessment & Plan    - possibly due to URI  - tapering steroids gradually  -wean off supplemental O2   - IV ABX  - RT protocol  - Nebs/inhalers          Aortic stenosis- (present on admission)   Assessment & Plan    - last ECHO on file here was June 2009: showed EF 65% with diastolic dysfunction and mild aortic stenosis and mild tricuspid  regurg  - ECHO today showed mild aortic stenosis with EF 60%              Metabolic acidosis   Assessment & Plan    Lactic acidosis present.  Also maybe due to renal dysfunction.  Continue sodium bicarb and monitor.        Gout- (present on admission)   Assessment & Plan    Allopurinol        HTN (hypertension)- (present on admission)   Assessment & Plan    Hold Losartan and HCTZ   resume as clinically appropriate    Continue hydralazine and carvedilol. Titrate up as needed. bp appears better controlled today.        Hypothyroidism- (present on admission)   Assessment & Plan    - chronic on Levothyroxine        History of rectal cancer- (present on admission)   Assessment & Plan    - with presence of colostomy        SSS (sick sinus syndrome) (CMS-HCC)- (present on admission)   Assessment & Plan    - history of  - presence of AICD/PPM        Esophageal reflux- (present on admission)   Assessment & Plan    Holding PPI to limit risk of C. diff with empiric antibiotic usage        Benign prostatic hypertrophy- (present on admission)   Assessment & Plan    Continue finasteride          Quality-Core Measures   DVT prophylaxis - mechanical:  SCDs      Marcus Webb D.O.

## 2018-03-01 NOTE — CARE PLAN
Problem: Skin Integrity  Goal: Risk for impaired skin integrity will decrease  Outcome: PROGRESSING AS EXPECTED  Mepilex in place, pt turns self in bed     Problem: Pain Management  Goal: Pain level will decrease to patient's comfort goal  Outcome: PROGRESSING AS EXPECTED

## 2018-03-01 NOTE — DISCHARGE PLANNING
Medical Social Work    MATEO met with pt's son, Richard, at bedside to inquire about SNF choice. MATEO provided Richard with a printed choice form and he reported he will be calling facilities now to determine where to send the form. MATEO explained pt is medically cleared to leave once accepted, so it is important it goes out today so facilities can begin their reviews. Richard agreeable to this.

## 2018-03-01 NOTE — DISCHARGE PLANNING
Medical Social Work    MATEO attempted to meet with pt's son at bedside to obtain SNF choice, but he was not there. MATEO called Richard but was not successful in reaching him. MATEO did request he leave choice form with RN if he comes back tonight, otherwise MATEO would request it in the morning. MATEO left Medicare Rights and advisement of the IMM on VM and left a hard copy of the IMM at bedside for him to look at. It was explained that as of tomorrow pt will have d/c orders and if no efforts have been made to obtain placement at SNF or other level of care then financial burden may fall to pt as Medicare may not pay for remainder of stay.

## 2018-03-01 NOTE — DISCHARGE PLANNING
Medical Social Work    Referral: MDT rounds    Intervention: Discussed pt's case in MDT rounds. Attending MD ordered HHC. Reviewed PT/OT evals, it is recommended that pt have 24/7 supervision if d/c home. Pt is only A/Ox1 oriented to self only. Discussed pt's further with Hospitalist RN and pt is not safe to d/c home with HHC unless pt has 24/7 supervision. This writer placed a call to pt's son Richard #[phone] and discussed pt's case. Richard states his father will not be able to have 24/7 care, pt has [facility] coming in throughout the week but not 24/7 support. Discussed SNF and son is agreeable to this and states pt has been to [facility] in the past and would not want this facility again. Discussed choice form and local [place] SNF's. Son requested SW email him a list and he will review tonight. Son states he will likely chose facilities close to his home as he is pretty busy with work and would like to visit pt as often as he can. Emailed choice form to aster Ramos at [email or web address]. Provided son with SS contact phone#. Updated hospitalist RN.     Plan: As above, son will review SNF choice form this evening.

## 2018-03-02 ENCOUNTER — APPOINTMENT (OUTPATIENT)
Dept: RADIOLOGY | Facility: MEDICAL CENTER | Age: 83
DRG: 189 | End: 2018-03-02
Attending: INTERNAL MEDICINE
Payer: MEDICARE

## 2018-03-02 LAB
ANION GAP SERPL CALC-SCNC: 13 MMOL/L (ref 0–11.9)
BASOPHILS # BLD AUTO: 0.3 % (ref 0–1.8)
BASOPHILS # BLD: 0.02 K/UL (ref 0–0.12)
BNP SERPL-MCNC: 523 PG/ML (ref 0–100)
BUN SERPL-MCNC: 53 MG/DL (ref 8–22)
CALCIUM SERPL-MCNC: 9.1 MG/DL (ref 8.5–10.5)
CHLORIDE SERPL-SCNC: 113 MMOL/L (ref 96–112)
CO2 SERPL-SCNC: 17 MMOL/L (ref 20–33)
COMMENT 1642: NORMAL
CREAT SERPL-MCNC: 1.6 MG/DL (ref 0.5–1.4)
EOSINOPHIL # BLD AUTO: 0 K/UL (ref 0–0.51)
EOSINOPHIL NFR BLD: 0 % (ref 0–6.9)
ERYTHROCYTE [DISTWIDTH] IN BLOOD BY AUTOMATED COUNT: 46.4 FL (ref 35.9–50)
GLUCOSE SERPL-MCNC: 158 MG/DL (ref 65–99)
HCT VFR BLD AUTO: 36.8 % (ref 42–52)
HGB BLD-MCNC: 12.2 G/DL (ref 14–18)
IMM GRANULOCYTES # BLD AUTO: 0.33 K/UL (ref 0–0.11)
IMM GRANULOCYTES NFR BLD AUTO: 5.2 % (ref 0–0.9)
LYMPHOCYTES # BLD AUTO: 0.79 K/UL (ref 1–4.8)
LYMPHOCYTES NFR BLD: 12.5 % (ref 22–41)
MAGNESIUM SERPL-MCNC: 1.9 MG/DL (ref 1.5–2.5)
MCH RBC QN AUTO: 30.3 PG (ref 27–33)
MCHC RBC AUTO-ENTMCNC: 33.2 G/DL (ref 33.7–35.3)
MCV RBC AUTO: 91.5 FL (ref 81.4–97.8)
MONOCYTES # BLD AUTO: 0.47 K/UL (ref 0–0.85)
MONOCYTES NFR BLD AUTO: 7.4 % (ref 0–13.4)
MORPHOLOGY BLD-IMP: NORMAL
NEUTROPHILS # BLD AUTO: 4.7 K/UL (ref 1.82–7.42)
NEUTROPHILS NFR BLD: 74.6 % (ref 44–72)
NRBC # BLD AUTO: 0 K/UL
NRBC BLD-RTO: 0 /100 WBC
PLATELET # BLD AUTO: 229 K/UL (ref 164–446)
PMV BLD AUTO: 9.8 FL (ref 9–12.9)
POTASSIUM SERPL-SCNC: 4 MMOL/L (ref 3.6–5.5)
PROCALCITONIN SERPL-MCNC: 0.09 NG/ML
RBC # BLD AUTO: 4.02 M/UL (ref 4.7–6.1)
SODIUM SERPL-SCNC: 143 MMOL/L (ref 135–145)
WBC # BLD AUTO: 6.3 K/UL (ref 4.8–10.8)

## 2018-03-02 PROCEDURE — 700102 HCHG RX REV CODE 250 W/ 637 OVERRIDE(OP): Performed by: INTERNAL MEDICINE

## 2018-03-02 PROCEDURE — 71045 X-RAY EXAM CHEST 1 VIEW: CPT

## 2018-03-02 PROCEDURE — 83735 ASSAY OF MAGNESIUM: CPT

## 2018-03-02 PROCEDURE — 80048 BASIC METABOLIC PNL TOTAL CA: CPT

## 2018-03-02 PROCEDURE — 97530 THERAPEUTIC ACTIVITIES: CPT

## 2018-03-02 PROCEDURE — 99232 SBSQ HOSP IP/OBS MODERATE 35: CPT | Performed by: INTERNAL MEDICINE

## 2018-03-02 PROCEDURE — 84145 PROCALCITONIN (PCT): CPT

## 2018-03-02 PROCEDURE — 36415 COLL VENOUS BLD VENIPUNCTURE: CPT

## 2018-03-02 PROCEDURE — 700111 HCHG RX REV CODE 636 W/ 250 OVERRIDE (IP): Performed by: INTERNAL MEDICINE

## 2018-03-02 PROCEDURE — 83880 ASSAY OF NATRIURETIC PEPTIDE: CPT

## 2018-03-02 PROCEDURE — 85025 COMPLETE CBC W/AUTO DIFF WBC: CPT

## 2018-03-02 PROCEDURE — 770006 HCHG ROOM/CARE - MED/SURG/GYN SEMI*

## 2018-03-02 PROCEDURE — A9270 NON-COVERED ITEM OR SERVICE: HCPCS | Performed by: INTERNAL MEDICINE

## 2018-03-02 PROCEDURE — 97535 SELF CARE MNGMENT TRAINING: CPT

## 2018-03-02 PROCEDURE — 700101 HCHG RX REV CODE 250: Performed by: INTERNAL MEDICINE

## 2018-03-02 PROCEDURE — 97112 NEUROMUSCULAR REEDUCATION: CPT

## 2018-03-02 RX ADMIN — DOXYCYCLINE 100 MG: 100 TABLET ORAL at 09:33

## 2018-03-02 RX ADMIN — METRONIDAZOLE 500 MG: 500 TABLET ORAL at 22:12

## 2018-03-02 RX ADMIN — HEPARIN SODIUM 5000 UNITS: 5000 INJECTION, SOLUTION INTRAVENOUS; SUBCUTANEOUS at 22:12

## 2018-03-02 RX ADMIN — PREDNISONE 20 MG: 20 TABLET ORAL at 09:34

## 2018-03-02 RX ADMIN — ASPIRIN 81 MG: 81 TABLET, CHEWABLE ORAL at 09:33

## 2018-03-02 RX ADMIN — METRONIDAZOLE 500 MG: 500 TABLET ORAL at 06:00

## 2018-03-02 RX ADMIN — CEFTRIAXONE 2 G: 2 INJECTION, POWDER, FOR SOLUTION INTRAMUSCULAR; INTRAVENOUS at 09:43

## 2018-03-02 RX ADMIN — SODIUM BICARBONATE 650 MG: 650 TABLET ORAL at 22:12

## 2018-03-02 RX ADMIN — LABETALOL HYDROCHLORIDE 10 MG: 5 INJECTION, SOLUTION INTRAVENOUS at 04:06

## 2018-03-02 RX ADMIN — SODIUM BICARBONATE 650 MG: 650 TABLET ORAL at 09:33

## 2018-03-02 RX ADMIN — DOXYCYCLINE 100 MG: 100 TABLET ORAL at 22:12

## 2018-03-02 RX ADMIN — HEPARIN SODIUM 5000 UNITS: 5000 INJECTION, SOLUTION INTRAVENOUS; SUBCUTANEOUS at 09:33

## 2018-03-02 RX ADMIN — LABETALOL HYDROCHLORIDE 10 MG: 5 INJECTION, SOLUTION INTRAVENOUS at 18:25

## 2018-03-02 RX ADMIN — CETIRIZINE HYDROCHLORIDE 10 MG: 10 TABLET, FILM COATED ORAL at 09:33

## 2018-03-02 RX ADMIN — FINASTERIDE 5 MG: 5 TABLET, FILM COATED ORAL at 09:34

## 2018-03-02 RX ADMIN — CARVEDILOL 3.12 MG: 6.25 TABLET, FILM COATED ORAL at 09:33

## 2018-03-02 RX ADMIN — HYDRALAZINE HYDROCHLORIDE 50 MG: 25 TABLET, FILM COATED ORAL at 05:39

## 2018-03-02 RX ADMIN — HYDRALAZINE HYDROCHLORIDE 50 MG: 25 TABLET, FILM COATED ORAL at 14:21

## 2018-03-02 RX ADMIN — METRONIDAZOLE 500 MG: 500 TABLET ORAL at 14:21

## 2018-03-02 RX ADMIN — CARVEDILOL 3.12 MG: 6.25 TABLET, FILM COATED ORAL at 18:21

## 2018-03-02 RX ADMIN — ALLOPURINOL 100 MG: 100 TABLET ORAL at 09:33

## 2018-03-02 RX ADMIN — SODIUM BICARBONATE 650 MG: 650 TABLET ORAL at 14:21

## 2018-03-02 RX ADMIN — LEVOTHYROXINE SODIUM 150 MCG: 150 TABLET ORAL at 05:38

## 2018-03-02 RX ADMIN — HYDRALAZINE HYDROCHLORIDE 50 MG: 25 TABLET, FILM COATED ORAL at 22:12

## 2018-03-02 ASSESSMENT — ENCOUNTER SYMPTOMS
SPEECH CHANGE: 0
WEAKNESS: 1
FOCAL WEAKNESS: 0
PALPITATIONS: 0
FALLS: 0
NERVOUS/ANXIOUS: 0
DOUBLE VISION: 0
SHORTNESS OF BREATH: 0
HEARTBURN: 0
DIARRHEA: 0
PHOTOPHOBIA: 0
NAUSEA: 0
VOMITING: 0
ABDOMINAL PAIN: 0
FEVER: 0
HEADACHES: 0
WHEEZING: 0
COUGH: 1
BLURRED VISION: 0
NECK PAIN: 0
DIZZINESS: 0

## 2018-03-02 ASSESSMENT — COGNITIVE AND FUNCTIONAL STATUS - GENERAL
SUGGESTED CMS G CODE MODIFIER MOBILITY: CK
MOVING TO AND FROM BED TO CHAIR: A LITTLE
TURNING FROM BACK TO SIDE WHILE IN FLAT BAD: A LITTLE
TOILETING: A LITTLE
SUGGESTED CMS G CODE MODIFIER DAILY ACTIVITY: CK
MOVING FROM LYING ON BACK TO SITTING ON SIDE OF FLAT BED: A LITTLE
WALKING IN HOSPITAL ROOM: A LITTLE
DAILY ACTIVITIY SCORE: 16
EATING MEALS: A LITTLE
PERSONAL GROOMING: A LITTLE
CLIMB 3 TO 5 STEPS WITH RAILING: A LOT
HELP NEEDED FOR BATHING: A LOT
STANDING UP FROM CHAIR USING ARMS: A LITTLE
MOBILITY SCORE: 17
DRESSING REGULAR LOWER BODY CLOTHING: A LOT
DRESSING REGULAR UPPER BODY CLOTHING: A LITTLE

## 2018-03-02 ASSESSMENT — PAIN SCALES - GENERAL
PAINLEVEL_OUTOF10: 0

## 2018-03-02 ASSESSMENT — GAIT ASSESSMENTS
ASSISTIVE DEVICE: FRONT WHEEL WALKER
DISTANCE (FEET): 2
DEVIATION: DECREASED BASE OF SUPPORT;DECREASED HEEL STRIKE;DECREASED TOE OFF
GAIT LEVEL OF ASSIST: STAND BY ASSIST

## 2018-03-02 NOTE — DISCHARGE PLANNING
Medical Social Work    Referral:  Rounds    Intervention:  Pt discussed during rounds.  Pt is not medically clear.    Plan:  SW will remain available for dc planning

## 2018-03-02 NOTE — THERAPY
"Pt w/impaired balance, coordination, gait, and activity tolerance. Pt seems to be self limiting, despite education. Pt required verbal cuing and sequencing for sit<->stands to transfer back to bed from a low surface. Pt demonstrtaed shuffled gait. Pt would benefit from further acute PT txs to progress towards goals and independence. Would recommend post acute placement to address deficits.    Physical Therapy Treatment completed.   Bed Mobility:  Supine to Sit:  (in chair)  Transfers: Sit to Stand: Minimal Assist  Gait: Level Of Assist: Stand by Assist with Front-Wheel Walker       Plan of Care: Will benefit from Physical Therapy 3 times per week    See \"Rehab Therapy-Acute\" Patient Summary Report for complete documentation.       "

## 2018-03-02 NOTE — PROGRESS NOTES
"Assumed pt care at 1900 and received bedside report. Pt alert to self and hallucinating. Pt c/o of generalized pain and medicated per MAR. Pt requiring Q2 turns. Pt asking for RN to \"please send me to heaven\" multiple times. Reassurance and one to one discussion provided. Condom catheter in place. Pt refusing snacks and po fluids. Ostomy care provided. Hourly rounding in place.   "

## 2018-03-02 NOTE — DISCHARGE PLANNING
Received choice form from Mary(MATEO). Referral sent to [facility] and [facility].

## 2018-03-02 NOTE — DISCHARGE PLANNING
TCN spoke with patient son regarding SNF choice. Patient son selected [facility] and [facility]. Choice faxed to [facility]. Son to be in hospital later this am if SNF choice needs to be expanded. TCN to follow as needed.

## 2018-03-02 NOTE — PROGRESS NOTES
Pt is Aox1 to self. He does have a condom cath on. IV is patent on the right arm. Fall alarms are set and call light is in reach. Pt able to swollow crushed pills in pudding. Family has been by to sit with pt. No C/O pain at this time.

## 2018-03-02 NOTE — THERAPY
"Occupational Therapy Treatment completed with focus on ADLs, ADL transfers and patient education.  Functional Status:  Pt seen for OT tx today.  Pt was pleasantly confused but cooperative during the session.  Pt was nonverbal throughout using hand signals to communicate needing cues to talk to express needs.  Continues to be limited by tremors, weakness, endurance, cognition, and self care.  Pt completed LB dressing was min A for pants and max A for socks, UB dressing with CGA, and seated gr/hy with CGA.  Needing frequent cues to initiate, sequence, follow through, and problem solve during ADL tasks.  Pt needed help with set up of breakfast was he was unable to problem solve what the items were on his tray or open them.  Pt was trying to drink his syrup despite stating he wanted it on his Bulgarian toast.  Pt completed supine to sit with CGA, sit to stand with min A, and stand pivot with CGA.    Plan of Care: Will benefit from Occupational Therapy 3 times per week  Discharge Recommendations:  Equipment Will Continue to Assess for Equipment Needs.    See \"Rehab Therapy-Acute\" Patient Summary Report for complete documentation.   "

## 2018-03-02 NOTE — CARE PLAN
Problem: Communication  Goal: The ability to communicate needs accurately and effectively will improve  Outcome: PROGRESSING AS EXPECTED  Call light within reach. Discussed the use of the call light and notifying RN or CNA of needs. Reinforcement needed     Problem: Safety  Goal: Will remain free from injury  Outcome: PROGRESSING AS EXPECTED  Bed alarm on and call light and personal belongings within reach

## 2018-03-03 PROBLEM — J45.909 REACTIVE AIRWAY DISEASE: Status: RESOLVED | Noted: 2018-02-24 | Resolved: 2018-03-03

## 2018-03-03 PROBLEM — D72.829 LEUKOCYTOSIS: Status: RESOLVED | Noted: 2018-02-24 | Resolved: 2018-03-03

## 2018-03-03 PROBLEM — J20.9 ACUTE BRONCHITIS: Status: RESOLVED | Noted: 2018-02-24 | Resolved: 2018-03-03

## 2018-03-03 LAB
ANION GAP SERPL CALC-SCNC: 9 MMOL/L (ref 0–11.9)
ANISOCYTOSIS BLD QL SMEAR: ABNORMAL
BASOPHILS # BLD AUTO: 0 % (ref 0–1.8)
BASOPHILS # BLD: 0 K/UL (ref 0–0.12)
BNP SERPL-MCNC: 656 PG/ML (ref 0–100)
BUN SERPL-MCNC: 50 MG/DL (ref 8–22)
CALCIUM SERPL-MCNC: 9 MG/DL (ref 8.5–10.5)
CHLORIDE SERPL-SCNC: 116 MMOL/L (ref 96–112)
CO2 SERPL-SCNC: 19 MMOL/L (ref 20–33)
CREAT SERPL-MCNC: 1.62 MG/DL (ref 0.5–1.4)
EOSINOPHIL # BLD AUTO: 0 K/UL (ref 0–0.51)
EOSINOPHIL NFR BLD: 0 % (ref 0–6.9)
ERYTHROCYTE [DISTWIDTH] IN BLOOD BY AUTOMATED COUNT: 45.5 FL (ref 35.9–50)
GLUCOSE SERPL-MCNC: 160 MG/DL (ref 65–99)
HCT VFR BLD AUTO: 35.1 % (ref 42–52)
HGB BLD-MCNC: 11.8 G/DL (ref 14–18)
LYMPHOCYTES # BLD AUTO: 0.46 K/UL (ref 1–4.8)
LYMPHOCYTES NFR BLD: 8.5 % (ref 22–41)
MANUAL DIFF BLD: NORMAL
MCH RBC QN AUTO: 30.6 PG (ref 27–33)
MCHC RBC AUTO-ENTMCNC: 33.6 G/DL (ref 33.7–35.3)
MCV RBC AUTO: 90.9 FL (ref 81.4–97.8)
MICROCYTES BLD QL SMEAR: ABNORMAL
MONOCYTES # BLD AUTO: 0.17 K/UL (ref 0–0.85)
MONOCYTES NFR BLD AUTO: 3.1 % (ref 0–13.4)
MORPHOLOGY BLD-IMP: NORMAL
NEUTROPHILS # BLD AUTO: 4.77 K/UL (ref 1.82–7.42)
NEUTROPHILS NFR BLD: 86.8 % (ref 44–72)
NEUTS BAND NFR BLD MANUAL: 1.6 % (ref 0–10)
NRBC # BLD AUTO: 0.02 K/UL
NRBC BLD-RTO: 0.4 /100 WBC
PLATELET # BLD AUTO: 213 K/UL (ref 164–446)
PLATELET BLD QL SMEAR: NORMAL
PMV BLD AUTO: 10.1 FL (ref 9–12.9)
POTASSIUM SERPL-SCNC: 3.6 MMOL/L (ref 3.6–5.5)
RBC # BLD AUTO: 3.86 M/UL (ref 4.7–6.1)
RBC BLD AUTO: PRESENT
SMUDGE CELLS BLD QL SMEAR: NORMAL
SODIUM SERPL-SCNC: 144 MMOL/L (ref 135–145)
WBC # BLD AUTO: 5.4 K/UL (ref 4.8–10.8)

## 2018-03-03 PROCEDURE — 85027 COMPLETE CBC AUTOMATED: CPT

## 2018-03-03 PROCEDURE — 770006 HCHG ROOM/CARE - MED/SURG/GYN SEMI*

## 2018-03-03 PROCEDURE — 99232 SBSQ HOSP IP/OBS MODERATE 35: CPT | Performed by: INTERNAL MEDICINE

## 2018-03-03 PROCEDURE — 80048 BASIC METABOLIC PNL TOTAL CA: CPT

## 2018-03-03 PROCEDURE — A9270 NON-COVERED ITEM OR SERVICE: HCPCS | Performed by: INTERNAL MEDICINE

## 2018-03-03 PROCEDURE — 36415 COLL VENOUS BLD VENIPUNCTURE: CPT

## 2018-03-03 PROCEDURE — 700102 HCHG RX REV CODE 250 W/ 637 OVERRIDE(OP): Performed by: INTERNAL MEDICINE

## 2018-03-03 PROCEDURE — 83880 ASSAY OF NATRIURETIC PEPTIDE: CPT

## 2018-03-03 PROCEDURE — 85007 BL SMEAR W/DIFF WBC COUNT: CPT

## 2018-03-03 PROCEDURE — 700111 HCHG RX REV CODE 636 W/ 250 OVERRIDE (IP): Performed by: INTERNAL MEDICINE

## 2018-03-03 RX ORDER — HEPARIN SODIUM 5000 [USP'U]/ML
5000 INJECTION, SOLUTION INTRAVENOUS; SUBCUTANEOUS EVERY 12 HOURS
Refills: 0
Start: 2018-03-03

## 2018-03-03 RX ORDER — CARVEDILOL 3.12 MG/1
3.12 TABLET ORAL 2 TIMES DAILY WITH MEALS
Qty: 60 TAB
Start: 2018-03-04 | End: 2018-03-05

## 2018-03-03 RX ORDER — ACETAMINOPHEN 325 MG/1
650 TABLET ORAL EVERY 6 HOURS PRN
Qty: 30 TAB | Refills: 0
Start: 2018-03-03

## 2018-03-03 RX ORDER — IPRATROPIUM BROMIDE AND ALBUTEROL SULFATE 2.5; .5 MG/3ML; MG/3ML
3 SOLUTION RESPIRATORY (INHALATION) EVERY 6 HOURS PRN
Qty: 30 BULLET
Start: 2018-03-03

## 2018-03-03 RX ORDER — HYDRALAZINE HYDROCHLORIDE 50 MG/1
50 TABLET, FILM COATED ORAL EVERY 8 HOURS
Qty: 90 TAB
Start: 2018-03-03

## 2018-03-03 RX ORDER — OMEPRAZOLE 20 MG/1
20 CAPSULE, DELAYED RELEASE ORAL DAILY
Qty: 30 CAP
Start: 2018-03-03

## 2018-03-03 RX ORDER — LEVOTHYROXINE SODIUM 0.15 MG/1
150 TABLET ORAL
Qty: 30 TAB
Start: 2018-03-03

## 2018-03-03 RX ORDER — ERGOCALCIFEROL 1.25 MG/1
50000 CAPSULE ORAL
Qty: 30 CAP
Start: 2018-03-05

## 2018-03-03 RX ORDER — FINASTERIDE 5 MG/1
5 TABLET, FILM COATED ORAL DAILY
Qty: 30 TAB
Start: 2018-03-03

## 2018-03-03 RX ORDER — ASPIRIN 81 MG/1
81 TABLET, CHEWABLE ORAL DAILY
Start: 2018-03-03

## 2018-03-03 RX ORDER — ALLOPURINOL 100 MG/1
100 TABLET ORAL DAILY
Qty: 30 TAB
Start: 2018-03-03

## 2018-03-03 RX ADMIN — FINASTERIDE 5 MG: 5 TABLET, FILM COATED ORAL at 08:56

## 2018-03-03 RX ADMIN — HYDRALAZINE HYDROCHLORIDE 50 MG: 25 TABLET, FILM COATED ORAL at 05:50

## 2018-03-03 RX ADMIN — HEPARIN SODIUM 5000 UNITS: 5000 INJECTION, SOLUTION INTRAVENOUS; SUBCUTANEOUS at 20:44

## 2018-03-03 RX ADMIN — ASPIRIN 81 MG: 81 TABLET, CHEWABLE ORAL at 08:56

## 2018-03-03 RX ADMIN — LEVOTHYROXINE SODIUM 150 MCG: 150 TABLET ORAL at 05:50

## 2018-03-03 RX ADMIN — CARVEDILOL 3.12 MG: 6.25 TABLET, FILM COATED ORAL at 08:56

## 2018-03-03 RX ADMIN — ALLOPURINOL 100 MG: 100 TABLET ORAL at 08:56

## 2018-03-03 RX ADMIN — HYDRALAZINE HYDROCHLORIDE 50 MG: 25 TABLET, FILM COATED ORAL at 15:07

## 2018-03-03 RX ADMIN — CARVEDILOL 3.12 MG: 6.25 TABLET, FILM COATED ORAL at 17:27

## 2018-03-03 RX ADMIN — HEPARIN SODIUM 5000 UNITS: 5000 INJECTION, SOLUTION INTRAVENOUS; SUBCUTANEOUS at 08:56

## 2018-03-03 RX ADMIN — HYDRALAZINE HYDROCHLORIDE 50 MG: 25 TABLET, FILM COATED ORAL at 20:44

## 2018-03-03 RX ADMIN — CETIRIZINE HYDROCHLORIDE 10 MG: 10 TABLET, FILM COATED ORAL at 08:56

## 2018-03-03 ASSESSMENT — ENCOUNTER SYMPTOMS
NECK PAIN: 0
DOUBLE VISION: 0
PHOTOPHOBIA: 0
NERVOUS/ANXIOUS: 0
DIARRHEA: 0
HEARTBURN: 0
WHEEZING: 0
SHORTNESS OF BREATH: 0
FOCAL WEAKNESS: 0
NAUSEA: 0
COUGH: 1
FEVER: 0
VOMITING: 0
ABDOMINAL PAIN: 0
FALLS: 0
HEADACHES: 0
PALPITATIONS: 0
SPEECH CHANGE: 0
DIZZINESS: 0
BLURRED VISION: 0
WEAKNESS: 1

## 2018-03-03 ASSESSMENT — PAIN SCALES - GENERAL
PAINLEVEL_OUTOF10: 0
PAINLEVEL_OUTOF10: 0

## 2018-03-03 NOTE — PROGRESS NOTES
[facility] Hospitalist Progress Note    Date of Service: 3/2/2018    Chief Complaint  89 y.o. male admitted 2018 with cough x 1 week and weakness.     Interval Problem Update  18:  The patient is awake and alert and eating his lunch during my examination.  The patient states that he is getting better.     18:  The patient appears in happy mood.  Caregiver at bedside.    3/1/18:  No acute event overnight.  The patient is sitting comfortably in chair and eating his meal.  Attempted to call the patient's son multiple times but no answer.    3/2/18.  Seen and examined the patient in morning.  He appears more sleepy today. However, he can easily be awaken and has no complaint.  Discussed with the patient's son at bedside.  He is agreeable with discharging the patient to a post-acute facility.    Consultants/Specialty  None.    Disposition  SNF  Anticipate discharge tomorrow      Review of Systems   Constitutional: Negative for fever.   HENT: Positive for hearing loss. Negative for congestion.    Eyes: Negative for blurred vision, double vision and photophobia.   Respiratory: Positive for cough. Negative for shortness of breath and wheezing.    Cardiovascular: Negative for chest pain and palpitations.   Gastrointestinal: Negative for abdominal pain, diarrhea, heartburn, nausea and vomiting.   Musculoskeletal: Negative for falls and neck pain.   Neurological: Positive for weakness. Negative for dizziness, speech change, focal weakness and headaches.   Psychiatric/Behavioral: The patient is not nervous/anxious.       Physical Exam  Laboratory/Imaging   Hemodynamics  Temp (24hrs), Av.8 °C (98.2 °F), Min:36.4 °C (97.5 °F), Max:37.3 °C (99.1 °F)   Temperature: 36.4 °C (97.5 °F)  Pulse  Av.9  Min: 52  Max: 122    Blood Pressure : 151/88      Respiratory      Respiration: 16, Pulse Oximetry: 94 %     Work Of Breathing / Effort: Mild  RUL Breath Sounds: Rhonchi, RML Breath Sounds: Rhonchi, RLL Breath Sounds:  Rhonchi, ETHAN Breath Sounds: Rhonchi, LLL Breath Sounds: Rhonchi    Fluids    Intake/Output Summary (Last 24 hours) at 03/02/18 1705  Last data filed at 03/02/18 0400   Gross per 24 hour   Intake             1212 ml   Output              450 ml   Net              762 ml       Nutrition  Orders Placed This Encounter   Procedures   • Diet Order     Standing Status:   Standing     Number of Occurrences:   1     Order Specific Question:   Diet:     Answer:   Regular [1]     Order Specific Question:   Texture/Fiber modifications:     Answer:   Dysphagia 2(Pureed/Chopped)specify fluid consistency(question 6) [2]     Order Specific Question:   Consistency/Fluid modifications:     Answer:   Thin Liquids [3]     Physical Exam   Constitutional: Vital signs are normal. He is cooperative. No distress.   Frail elderly   HENT:   Head: Normocephalic.   Right Ear: Hearing normal.   Left Ear: Hearing normal.   Nose: Nose normal.   Mouth/Throat: Oropharynx is clear and moist and mucous membranes are normal.   Eyes: EOM and lids are normal. Right eye exhibits no discharge. Left eye exhibits no discharge. No scleral icterus.   Neck: Phonation normal. No JVD present.   Cardiovascular: Regular rhythm and intact distal pulses.    Murmur heard.  Pulmonary/Chest: Effort normal. No respiratory distress. He has no wheezes.   Diminished breath sounds in lung bases.   Neurological: He is alert.   Skin: He is not diaphoretic.   Nursing note and vitals reviewed.      Recent Labs      02/28/18   0326  03/01/18 0224  03/02/18   0301   WBC  17.7*  11.9*  6.3   RBC  4.64*  4.54*  4.02*   HEMOGLOBIN  14.7  13.9*  12.2*   HEMATOCRIT  43.4  40.9*  36.8*   MCV  93.5  90.1  91.5   MCH  31.7  30.6  30.3   MCHC  33.9  34.0  33.2*   RDW  46.9  45.6  46.4   PLATELETCT  255  259  229   MPV  9.8  9.8  9.8     Recent Labs      02/28/18   0326  03/01/18   0224  03/02/18   0301   SODIUM  135  142  143   POTASSIUM  3.8  4.6  4.0   CHLORIDE  108  112  113*   CO2   14*  17*  17*   GLUCOSE  196*  161*  158*   BUN  68*  55*  53*   CREATININE  2.19*  1.72*  1.60*   CALCIUM  9.6  9.1  9.1         Recent Labs      03/02/18   0301   BNPBTYPENAT  523*              Assessment/Plan     Acute renal failure superimposed on stage 3 chronic kidney disease (CMS-HCC)- (present on admission)   Assessment & Plan    - renal function is improving gradually toward baseline.  -- renal ultrasound negative for hydronephrosis.  -- normal uric acid and ck level.  - avoid nephrotoxins  - renally dose medications as necessary  -- stopped iv hydration as the patient's last chest xray indicate possible vascular congestion.  -- hold losartan and switch to alternative antihypertensive therapy.        Acute bronchitis- (present on admission)   Assessment & Plan    Clinically patient appears to have acute bronchitis 2/2 to underlying viral illness  Diet modify per speech therapy. Dysphagia II, Thin Liquid.  Continue IV antibiotic for 7 days total.  procalcitonin level normalized.        DNR (do not resuscitate)- (present on admission)   Assessment & Plan    - Patient speaks about [place] time [place] getting shorter   - Palliative care consult today        Lactic acidosis- (present on admission)   Assessment & Plan    - no fevers  - empiric IV ABX  - BC negative          Leukocytosis- (present on admission)   Assessment & Plan    - likely from steroids.  Resolved with tapering of steroid dosage.  - no fevers  - am CBC        Reactive airway disease- (present on admission)   Assessment & Plan    - possibly due to URI  - tapering steroids gradually, will discontinue soon.  -weaned off supplemental O2   - IV ABX  - RT protocol  - Nebs/inhalers          Aortic stenosis- (present on admission)   Assessment & Plan    - last ECHO on file here was June 2009: showed EF 65% with diastolic dysfunction and mild aortic stenosis and mild tricuspid regurg  - latest ECHO showed mild aortic stenosis with EF 60%               Metabolic acidosis   Assessment & Plan    Lactic acidosis present.  Also maybe due to renal dysfunction.  Continue sodium bicarb and monitor.        Dementia   Assessment & Plan    Appears to be at baseline.        Gout- (present on admission)   Assessment & Plan    Allopurinol        HTN (hypertension)- (present on admission)   Assessment & Plan    Hold Losartan and HCTZ   resume as clinically appropriate    Continue hydralazine and carvedilol. Titrate up as needed. bp appears better controlled today.        Hypothyroidism- (present on admission)   Assessment & Plan    - chronic on Levothyroxine        History of rectal cancer- (present on admission)   Assessment & Plan    - with presence of colostomy        SSS (sick sinus syndrome) (CMS-HCC)- (present on admission)   Assessment & Plan    - history of  - presence of AICD/PPM        Esophageal reflux- (present on admission)   Assessment & Plan    Holding PPI to limit risk of C. diff with empiric antibiotic usage        Benign prostatic hypertrophy- (present on admission)   Assessment & Plan    Continue finasteride          Quality-Core Measures   DVT prophylaxis - mechanical:  SCDs      Marcus Webb D.O.

## 2018-03-03 NOTE — CARE PLAN
Problem: Respiratory:  Goal: Respiratory status will improve  Outcome: PROGRESSING AS EXPECTED   in place and oxygen saturation remains above 90. Pt coughing. Education provided on coughing and deep breathing, reinforcement needed. Turning pt Q2.       Problem: Fluid Volume:  Goal: Will maintain balanced intake and output  Outcome: PROGRESSING SLOWER THAN EXPECTED  Pt has little interest in taking PO fluids.

## 2018-03-03 NOTE — ASSESSMENT & PLAN NOTE
High risk for delirium. Avoid benzodiazepines or anticholinergic medications. Minimize lines. Avoid Shaw catheter. Daily orientation.

## 2018-03-03 NOTE — PROGRESS NOTES
Assumed pt care at 1900. Pt alert and oriented X 2. Pt denies chest pain, sob, numbness and tingling, nausea and vomiting, headache, and blurry or double vision. Pt denies pain. Pt refusing ambulation.  Pt lethargic but arouses and following commands. Pupils reactive and equil. POC discussed and education provided on administered medications. All questions and concerns addressed. Fall precautions, hourly rounding and Q4 hour neuro checks in place.

## 2018-03-03 NOTE — PROGRESS NOTES
[facility] Hospitalist Progress Note    Date of Service: 3/3/2018    Chief Complaint  89 y.o. male admitted 2018 with cough x 1 week and weakness.     Interval Problem Update  18:  The patient is awake and alert and eating his lunch during my examination.  The patient states that he is getting better.     18:  The patient appears in happy mood.  Caregiver at bedside.    3/1/18:  No acute event overnight.  The patient is sitting comfortably in chair and eating his meal.  Attempted to call the patient's son multiple times but no answer.    3/2/18.  Seen and examined the patient in morning.  He appears more sleepy today. However, he can easily be awaken and has no complaint.  Discussed with the patient's son at bedside.  He is agreeable with discharging the patient to a post-acute facility.     3/3/18:  The patient appears some what depressed this morning.   Otherwise, he denies any chest pain or shortness of breath.    Consultants/Specialty  None.    Disposition  SNF      Review of Systems   Constitutional: Negative for fever.   HENT: Positive for hearing loss. Negative for congestion.    Eyes: Negative for blurred vision, double vision and photophobia.   Respiratory: Positive for cough. Negative for shortness of breath and wheezing.    Cardiovascular: Negative for chest pain and palpitations.   Gastrointestinal: Negative for abdominal pain, diarrhea, heartburn, nausea and vomiting.   Musculoskeletal: Negative for falls and neck pain.   Neurological: Positive for weakness. Negative for dizziness, speech change, focal weakness and headaches.   Psychiatric/Behavioral: The patient is not nervous/anxious.       Physical Exam  Laboratory/Imaging   Hemodynamics  Temp (24hrs), Av.9 °C (98.5 °F), Min:36.4 °C (97.5 °F), Max:37.4 °C (99.3 °F)   Temperature: 37.4 °C (99.3 °F)  Pulse  Av.9  Min: 52  Max: 122    Blood Pressure : 141/102 (RN Notified)      Respiratory      Respiration: 16, Pulse Oximetry: 93 %      Work Of Breathing / Effort: Mild  RUL Breath Sounds: Rhonchi, RML Breath Sounds: Rhonchi, RLL Breath Sounds: Rhonchi, ETHAN Breath Sounds: Rhonchi, LLL Breath Sounds: Rhonchi    Fluids    Intake/Output Summary (Last 24 hours) at 03/03/18 0841  Last data filed at 03/03/18 0400   Gross per 24 hour   Intake               60 ml   Output              250 ml   Net             -190 ml       Nutrition  Orders Placed This Encounter   Procedures   • Diet Order     Standing Status:   Standing     Number of Occurrences:   1     Order Specific Question:   Diet:     Answer:   Regular [1]     Order Specific Question:   Texture/Fiber modifications:     Answer:   Dysphagia 2(Pureed/Chopped)specify fluid consistency(question 6) [2]     Order Specific Question:   Consistency/Fluid modifications:     Answer:   Thin Liquids [3]     Physical Exam   Constitutional: Vital signs are normal. He is cooperative. No distress.   Frail elderly   HENT:   Head: Normocephalic.   Right Ear: Hearing normal.   Left Ear: Hearing normal.   Nose: Nose normal.   Mouth/Throat: Oropharynx is clear and moist and mucous membranes are normal.   Eyes: EOM and lids are normal. Right eye exhibits no discharge. Left eye exhibits no discharge. No scleral icterus.   Neck: Phonation normal. No JVD present.   Cardiovascular: Regular rhythm and intact distal pulses.    Murmur heard.  Pulmonary/Chest: Effort normal. No respiratory distress. He has no wheezes.   Diminished breath sounds in lung bases.   Neurological: He is alert.   Skin: He is not diaphoretic.   Nursing note and vitals reviewed.      Recent Labs      03/01/18   0224  03/02/18   0301  03/03/18   0320   WBC  11.9*  6.3  5.4   RBC  4.54*  4.02*  3.86*   HEMOGLOBIN  13.9*  12.2*  11.8*   HEMATOCRIT  40.9*  36.8*  35.1*   MCV  90.1  91.5  90.9   MCH  30.6  30.3  30.6   MCHC  34.0  33.2*  33.6*   RDW  45.6  46.4  45.5   PLATELETCT  259  229  213   MPV  9.8  9.8  10.1     Recent Labs      03/01/18 0224   03/02/18   0301  03/03/18   0320   SODIUM  142  143  144   POTASSIUM  4.6  4.0  3.6   CHLORIDE  112  113*  116*   CO2  17*  17*  19*   GLUCOSE  161*  158*  160*   BUN  55*  53*  50*   CREATININE  1.72*  1.60*  1.62*   CALCIUM  9.1  9.1  9.0         Recent Labs      03/02/18   0301  03/03/18   0320   BNPBTYPENAT  523*  656*              Assessment/Plan     Acute renal failure superimposed on stage 3 chronic kidney disease (CMS-HCC)- (present on admission)   Assessment & Plan    - renal function is stabilized  -- renal ultrasound negative for hydronephrosis.  -- normal uric acid and ck level.  - avoid nephrotoxins  - renally dose medication as necessary  -- stopped iv hydration as the patient's last chest xray indicate possible vascular congestion.  -- hold losartan and switch to alternative antihypertensive therapy.        Acute bronchitis- (present on admission)   Assessment & Plan    Clinically patient appears to have acute bronchitis 2/2 to underlying viral illness  Diet modify per speech therapy. Dysphagia II, Thin Liquid.  finished IV antibiotic as procalcitonin level normalized.        DNR (do not resuscitate)- (present on admission)   Assessment & Plan    - Patient speaks about his time [place] getting shorter   - Palliative care consulted        Lactic acidosis- (present on admission)   Assessment & Plan    - no fevers  - empiric IV ABX  - BC negative          Leukocytosis- (present on admission)   Assessment & Plan    - likely from steroids.  Resolved with tapering of steroid dosage.  - no fevers  - am CBC        Reactive airway disease- (present on admission)   Assessment & Plan    - possibly due to URI  - tapering steroids gradually, will discontinue soon.  -weaned off supplemental O2   - IV ABX  - RT protocol  - Nebs/inhalers          Aortic stenosis- (present on admission)   Assessment & Plan    - last ECHO on file here was June 2009: showed EF 65% with diastolic dysfunction and mild aortic stenosis and  mild tricuspid regurg  - latest ECHO showed mild aortic stenosis with EF 60%              Metabolic acidosis   Assessment & Plan     maybe due to renal dysfunction in combination with respiratory dysfunction.  Discontinue sodium bicarb.        Dementia   Assessment & Plan    Appears to be at baseline.        Gout- (present on admission)   Assessment & Plan    Allopurinol        HTN (hypertension)- (present on admission)   Assessment & Plan    Hold Losartan and HCTZ   resume as clinically appropriate    Continue hydralazine and carvedilol. Titrate up as needed. bp appears better controlled today.        Hypothyroidism- (present on admission)   Assessment & Plan    - chronic on Levothyroxine        History of rectal cancer- (present on admission)   Assessment & Plan    - with presence of colostomy        SSS (sick sinus syndrome) (CMS-HCC)- (present on admission)   Assessment & Plan    - history of  - presence of AICD/PPM        Esophageal reflux- (present on admission)   Assessment & Plan    Holding PPI to limit risk of C. diff with empiric antibiotic usage        Benign prostatic hypertrophy- (present on admission)   Assessment & Plan    Continue finasteride          Quality-Core Measures   DVT prophylaxis - mechanical:  IRAJs      Marcus Webb D.O.

## 2018-03-03 NOTE — PROGRESS NOTES
Pt is lethargic this morning and is not completing ADLs. Will eat but will not feed himself. Continuous pulse ox still in place along with bed and chair alarms. Pt currently sitting up in the chair at this time.

## 2018-03-04 PROBLEM — R06.02 SHORTNESS OF BREATH: Status: ACTIVE | Noted: 2018-03-04

## 2018-03-04 LAB
ANION GAP SERPL CALC-SCNC: 11 MMOL/L (ref 0–11.9)
ANISOCYTOSIS BLD QL SMEAR: ABNORMAL
BASOPHILS # BLD AUTO: 0 % (ref 0–1.8)
BASOPHILS # BLD: 0 K/UL (ref 0–0.12)
BUN SERPL-MCNC: 45 MG/DL (ref 8–22)
CALCIUM SERPL-MCNC: 9.6 MG/DL (ref 8.5–10.5)
CHLORIDE SERPL-SCNC: 113 MMOL/L (ref 96–112)
CO2 SERPL-SCNC: 20 MMOL/L (ref 20–33)
CREAT SERPL-MCNC: 1.63 MG/DL (ref 0.5–1.4)
EOSINOPHIL # BLD AUTO: 0 K/UL (ref 0–0.51)
EOSINOPHIL NFR BLD: 0 % (ref 0–6.9)
ERYTHROCYTE [DISTWIDTH] IN BLOOD BY AUTOMATED COUNT: 47.6 FL (ref 35.9–50)
GLUCOSE SERPL-MCNC: 131 MG/DL (ref 65–99)
HCT VFR BLD AUTO: 42.8 % (ref 42–52)
HGB BLD-MCNC: 14.5 G/DL (ref 14–18)
LYMPHOCYTES # BLD AUTO: 0.7 K/UL (ref 1–4.8)
LYMPHOCYTES NFR BLD: 7 % (ref 22–41)
MANUAL DIFF BLD: NORMAL
MCH RBC QN AUTO: 31.7 PG (ref 27–33)
MCHC RBC AUTO-ENTMCNC: 33.9 G/DL (ref 33.7–35.3)
MCV RBC AUTO: 93.7 FL (ref 81.4–97.8)
METAMYELOCYTES NFR BLD MANUAL: 0.9 %
MICROCYTES BLD QL SMEAR: ABNORMAL
MONOCYTES # BLD AUTO: 0.43 K/UL (ref 0–0.85)
MONOCYTES NFR BLD AUTO: 4.3 % (ref 0–13.4)
MORPHOLOGY BLD-IMP: NORMAL
MYELOCYTES NFR BLD MANUAL: 4.3 %
NEUTROPHILS # BLD AUTO: 8.35 K/UL (ref 1.82–7.42)
NEUTROPHILS NFR BLD: 83.5 % (ref 44–72)
NRBC # BLD AUTO: 0.02 K/UL
NRBC BLD-RTO: 0.2 /100 WBC
PLATELET # BLD AUTO: 248 K/UL (ref 164–446)
PLATELET BLD QL SMEAR: NORMAL
PMV BLD AUTO: 10 FL (ref 9–12.9)
POTASSIUM SERPL-SCNC: 3.7 MMOL/L (ref 3.6–5.5)
RBC # BLD AUTO: 4.57 M/UL (ref 4.7–6.1)
RBC BLD AUTO: PRESENT
SODIUM SERPL-SCNC: 144 MMOL/L (ref 135–145)
WBC # BLD AUTO: 10 K/UL (ref 4.8–10.8)

## 2018-03-04 PROCEDURE — A9270 NON-COVERED ITEM OR SERVICE: HCPCS | Performed by: INTERNAL MEDICINE

## 2018-03-04 PROCEDURE — 700111 HCHG RX REV CODE 636 W/ 250 OVERRIDE (IP): Performed by: INTERNAL MEDICINE

## 2018-03-04 PROCEDURE — 85007 BL SMEAR W/DIFF WBC COUNT: CPT

## 2018-03-04 PROCEDURE — 80048 BASIC METABOLIC PNL TOTAL CA: CPT

## 2018-03-04 PROCEDURE — 99232 SBSQ HOSP IP/OBS MODERATE 35: CPT | Performed by: INTERNAL MEDICINE

## 2018-03-04 PROCEDURE — 770006 HCHG ROOM/CARE - MED/SURG/GYN SEMI*

## 2018-03-04 PROCEDURE — 700102 HCHG RX REV CODE 250 W/ 637 OVERRIDE(OP): Performed by: INTERNAL MEDICINE

## 2018-03-04 PROCEDURE — 36415 COLL VENOUS BLD VENIPUNCTURE: CPT

## 2018-03-04 PROCEDURE — 85027 COMPLETE CBC AUTOMATED: CPT

## 2018-03-04 RX ORDER — CARVEDILOL 6.25 MG/1
6.25 TABLET ORAL 2 TIMES DAILY WITH MEALS
Status: DISCONTINUED | OUTPATIENT
Start: 2018-03-04 | End: 2018-03-08 | Stop reason: HOSPADM

## 2018-03-04 RX ORDER — FLUTICASONE PROPIONATE 50 MCG
2 SPRAY, SUSPENSION (ML) NASAL DAILY
Status: DISCONTINUED | OUTPATIENT
Start: 2018-03-04 | End: 2018-03-08 | Stop reason: HOSPADM

## 2018-03-04 RX ORDER — FUROSEMIDE 10 MG/ML
20 INJECTION INTRAMUSCULAR; INTRAVENOUS
Status: DISCONTINUED | OUTPATIENT
Start: 2018-03-04 | End: 2018-03-08 | Stop reason: HOSPADM

## 2018-03-04 RX ORDER — CARVEDILOL 6.25 MG/1
3.12 TABLET ORAL ONCE
Status: COMPLETED | OUTPATIENT
Start: 2018-03-04 | End: 2018-03-04

## 2018-03-04 RX ADMIN — CETIRIZINE HYDROCHLORIDE 10 MG: 10 TABLET, FILM COATED ORAL at 07:42

## 2018-03-04 RX ADMIN — HEPARIN SODIUM 5000 UNITS: 5000 INJECTION, SOLUTION INTRAVENOUS; SUBCUTANEOUS at 07:42

## 2018-03-04 RX ADMIN — CARVEDILOL 3.12 MG: 6.25 TABLET, FILM COATED ORAL at 07:42

## 2018-03-04 RX ADMIN — FLUTICASONE PROPIONATE 100 MCG: 50 SPRAY, METERED NASAL at 15:03

## 2018-03-04 RX ADMIN — LEVOTHYROXINE SODIUM 150 MCG: 150 TABLET ORAL at 05:37

## 2018-03-04 RX ADMIN — FUROSEMIDE 20 MG: 10 INJECTION, SOLUTION INTRAMUSCULAR; INTRAVENOUS at 12:18

## 2018-03-04 RX ADMIN — HYDRALAZINE HYDROCHLORIDE 50 MG: 25 TABLET, FILM COATED ORAL at 05:37

## 2018-03-04 RX ADMIN — HYDRALAZINE HYDROCHLORIDE 50 MG: 25 TABLET, FILM COATED ORAL at 21:25

## 2018-03-04 RX ADMIN — ASPIRIN 81 MG: 81 TABLET, CHEWABLE ORAL at 07:42

## 2018-03-04 RX ADMIN — CARVEDILOL 6.25 MG: 6.25 TABLET, FILM COATED ORAL at 17:09

## 2018-03-04 RX ADMIN — ALLOPURINOL 100 MG: 100 TABLET ORAL at 07:42

## 2018-03-04 RX ADMIN — CARVEDILOL 3.12 MG: 6.25 TABLET, FILM COATED ORAL at 12:18

## 2018-03-04 RX ADMIN — HYDRALAZINE HYDROCHLORIDE 50 MG: 25 TABLET, FILM COATED ORAL at 15:03

## 2018-03-04 RX ADMIN — HEPARIN SODIUM 5000 UNITS: 5000 INJECTION, SOLUTION INTRAVENOUS; SUBCUTANEOUS at 21:25

## 2018-03-04 RX ADMIN — FINASTERIDE 5 MG: 5 TABLET, FILM COATED ORAL at 07:42

## 2018-03-04 RX ADMIN — LABETALOL HYDROCHLORIDE 10 MG: 5 INJECTION, SOLUTION INTRAVENOUS at 10:23

## 2018-03-04 ASSESSMENT — ENCOUNTER SYMPTOMS
ABDOMINAL PAIN: 0
PHOTOPHOBIA: 0
FOCAL WEAKNESS: 0
NECK PAIN: 0
SPEECH CHANGE: 0
HEADACHES: 0
NERVOUS/ANXIOUS: 0
HEARTBURN: 0
VOMITING: 0
BLURRED VISION: 0
WHEEZING: 0
PALPITATIONS: 0
FALLS: 0
DIARRHEA: 0
FEVER: 0
DIZZINESS: 0
DOUBLE VISION: 0
SHORTNESS OF BREATH: 1
NAUSEA: 0
WEAKNESS: 1

## 2018-03-04 NOTE — CARE PLAN
Problem: Safety  Goal: Will remain free from injury  Outcome: PROGRESSING AS EXPECTED  Bed low, bed alarm on, call light in reach and patient reoriented on call procedures.    Problem: Knowledge Deficit  Goal: Knowledge of disease process/condition, treatment plan, diagnostic tests, and medications will improve  Outcome: PROGRESSING SLOWER THAN EXPECTED  Patient care and medications explained to patient.  Patient has confusion and needs to be reoriented often.

## 2018-03-04 NOTE — PROGRESS NOTES
[facility] Hospitalist Progress Note    Date of Service: 3/4/2018    Chief Complaint  89 y.o. male admitted 2018 with cough x 1 week and weakness.     Interval Problem Update  18:  The patient is awake and alert and eating his lunch during my examination.  The patient states that he is getting better.     18:  The patient appears in happy mood.  Caregiver at bedside.    3/1/18:  No acute event overnight.  The patient is sitting comfortably in chair and eating his meal.  Attempted to call the patient's son multiple times but no answer.    3/2/18.  Seen and examined the patient in morning.  He appears more sleepy today. However, he can easily be awaken and has no complaint.  Discussed with the patient's son at bedside.  He is agreeable with discharging the patient to a post-acute facility.     3/3/18:  The patient appears some what depressed this morning.   Otherwise, he denies any chest pain or shortness of breath.    3/4/18:  The patient reports show shortness of breath with exertion. Otherwise, no new complaints.      Consultants/Specialty  None.    Disposition  SNF      Review of Systems   Constitutional: Negative for fever.   HENT: Positive for hearing loss. Negative for congestion.    Eyes: Negative for blurred vision, double vision and photophobia.   Respiratory: Positive for shortness of breath. Negative for wheezing.    Cardiovascular: Negative for chest pain and palpitations.   Gastrointestinal: Negative for abdominal pain, diarrhea, heartburn, nausea and vomiting.   Musculoskeletal: Negative for falls and neck pain.   Neurological: Positive for weakness. Negative for dizziness, speech change, focal weakness and headaches.   Psychiatric/Behavioral: The patient is not nervous/anxious.       Physical Exam  Laboratory/Imaging   Hemodynamics  Temp (24hrs), Av.6 °C (97.9 °F), Min:36.4 °C (97.5 °F), Max:36.8 °C (98.3 °F)   Temperature: 36.4 °C (97.6 °F)  Pulse  Av.7  Min: 52  Max: 122    Blood  Pressure : (!) 169/71 (RN notified)      Respiratory      Respiration: 15, Pulse Oximetry: 96 %     Work Of Breathing / Effort: Mild  RUL Breath Sounds: Rhonchi, RML Breath Sounds: Rhonchi, RLL Breath Sounds: Rhonchi, ETHAN Breath Sounds: Rhonchi, LLL Breath Sounds: Rhonchi    Fluids    Intake/Output Summary (Last 24 hours) at 03/04/18 0953  Last data filed at 03/04/18 0700   Gross per 24 hour   Intake               45 ml   Output                0 ml   Net               45 ml       Nutrition  Orders Placed This Encounter   Procedures   • Diet Order     Standing Status:   Standing     Number of Occurrences:   1     Order Specific Question:   Diet:     Answer:   Regular [1]     Order Specific Question:   Texture/Fiber modifications:     Answer:   Dysphagia 2(Pureed/Chopped)specify fluid consistency(question 6) [2]     Order Specific Question:   Consistency/Fluid modifications:     Answer:   Thin Liquids [3]     Physical Exam   Constitutional: Vital signs are normal. He is cooperative. No distress.   Frail elderly   HENT:   Head: Normocephalic.   Right Ear: Hearing normal.   Left Ear: Hearing normal.   Nose: Nose normal.   Mouth/Throat: Oropharynx is clear and moist and mucous membranes are normal.   Eyes: EOM and lids are normal. Right eye exhibits no discharge. Left eye exhibits no discharge. No scleral icterus.   Neck: Phonation normal. No JVD present.   Cardiovascular: Regular rhythm and intact distal pulses.    Murmur heard.  Pulmonary/Chest: Effort normal. No respiratory distress. He has no wheezes.   Diminished breath sounds in lung bases. Mild crackles in lung bases.   Neurological: He is alert.   Skin: He is not diaphoretic.   Nursing note and vitals reviewed.      Recent Labs      03/02/18   0301  03/03/18   0320  03/04/18   0225   WBC  6.3  5.4  10.0   RBC  4.02*  3.86*  4.57*   HEMOGLOBIN  12.2*  11.8*  14.5   HEMATOCRIT  36.8*  35.1*  42.8   MCV  91.5  90.9  93.7   MCH  30.3  30.6  31.7   MCHC  33.2*  33.6*   33.9   RDW  46.4  45.5  47.6   PLATELETCT  229  213  248   MPV  9.8  10.1  10.0     Recent Labs      03/02/18   0301  03/03/18   0320  03/04/18   0225   SODIUM  143  144  144   POTASSIUM  4.0  3.6  3.7   CHLORIDE  113*  116*  113*   CO2  17*  19*  20   GLUCOSE  158*  160*  131*   BUN  53*  50*  45*   CREATININE  1.60*  1.62*  1.63*   CALCIUM  9.1  9.0  9.6         Recent Labs      03/02/18   0301  03/03/18   0320   BNPBTYPENAT  523*  656*              Assessment/Plan     Acute renal failure superimposed on stage 3 chronic kidney disease (CMS-HCC)- (present on admission)   Assessment & Plan    - renal function is stabilized  -- renal ultrasound negative for hydronephrosis.  -- normal uric acid and ck level.  - avoid nephrotoxins  - renally dose medication as necessary  -- stopped iv hydration as the patient's last chest xray indicate possible vascular congestion.  -- hold losartan and switch to alternative antihypertensive therapy.        DNR (do not resuscitate)- (present on admission)   Assessment & Plan    - Patient speaks about his time [place] getting shorter   - Palliative care consulted        Lactic acidosis- (present on admission)   Assessment & Plan    - no fevers  - empiric IV ABX  - BC negative          Aortic stenosis- (present on admission)   Assessment & Plan    - last ECHO on file here was June 2009: showed EF 65% with diastolic dysfunction and mild aortic stenosis and mild tricuspid regurg  - latest ECHO showed mild aortic stenosis with EF 60%              Shortness of breath   Assessment & Plan    The patient did received large amount of fluid in the last few days.  Will give one dose of lasix and reassess.  The patient does have mild aortic stenosis per echocardiogram.  This may be contributing factor.        Metabolic acidosis   Assessment & Plan     maybe due to renal dysfunction in combination with respiratory dysfunction.  Discontinue sodium bicarb as metabolic acidosis resolved.         Dementia   Assessment & Plan    Appears to be at baseline.        Gout- (present on admission)   Assessment & Plan    Allopurinol        HTN (hypertension)- (present on admission)   Assessment & Plan    Hold Losartan and HCTZ   resume as clinically appropriate  BP is high today.  Continue hydralazine and increase carvedilol to better control bp.        Hypothyroidism- (present on admission)   Assessment & Plan    - chronic on Levothyroxine        History of rectal cancer- (present on admission)   Assessment & Plan    - with presence of colostomy        SSS (sick sinus syndrome) (CMS-HCC)- (present on admission)   Assessment & Plan    - history of  - presence of AICD/PPM        Esophageal reflux- (present on admission)   Assessment & Plan    Held PPI to limit risk of C. diff with antibiotic.  With discontinuation of antibiotic, will resume ppi.        Benign prostatic hypertrophy- (present on admission)   Assessment & Plan    Continue finasteride          Quality-Core Measures   DVT prophylaxis - mechanical:  IRAJs      Marcus Webb D.O.

## 2018-03-04 NOTE — DISCHARGE PLANNING
Medical Social Worker    Pt discussed in rounds. Pt is pending SNF acceptance at this time. Per attending MD, pt is medically cleared today.

## 2018-03-05 PROBLEM — F32.A DEPRESSION: Status: ACTIVE | Noted: 2018-03-05

## 2018-03-05 PROBLEM — R06.02 SHORTNESS OF BREATH: Status: RESOLVED | Noted: 2018-03-04 | Resolved: 2018-03-05

## 2018-03-05 LAB
ALBUMIN SERPL BCP-MCNC: 3.3 G/DL (ref 3.2–4.9)
BASOPHILS # BLD AUTO: 0.3 % (ref 0–1.8)
BASOPHILS # BLD: 0.02 K/UL (ref 0–0.12)
BUN SERPL-MCNC: 43 MG/DL (ref 8–22)
CALCIUM SERPL-MCNC: 9 MG/DL (ref 8.5–10.5)
CHLORIDE SERPL-SCNC: 112 MMOL/L (ref 96–112)
CO2 SERPL-SCNC: 19 MMOL/L (ref 20–33)
CREAT SERPL-MCNC: 1.55 MG/DL (ref 0.5–1.4)
EOSINOPHIL # BLD AUTO: 0.02 K/UL (ref 0–0.51)
EOSINOPHIL NFR BLD: 0.3 % (ref 0–6.9)
ERYTHROCYTE [DISTWIDTH] IN BLOOD BY AUTOMATED COUNT: 46.3 FL (ref 35.9–50)
GLUCOSE SERPL-MCNC: 138 MG/DL (ref 65–99)
HCT VFR BLD AUTO: 37.9 % (ref 42–52)
HGB BLD-MCNC: 12.8 G/DL (ref 14–18)
IMM GRANULOCYTES # BLD AUTO: 0.26 K/UL (ref 0–0.11)
IMM GRANULOCYTES NFR BLD AUTO: 4.1 % (ref 0–0.9)
LYMPHOCYTES # BLD AUTO: 0.72 K/UL (ref 1–4.8)
LYMPHOCYTES NFR BLD: 11.3 % (ref 22–41)
MAGNESIUM SERPL-MCNC: 1.9 MG/DL (ref 1.5–2.5)
MCH RBC QN AUTO: 31.1 PG (ref 27–33)
MCHC RBC AUTO-ENTMCNC: 33.8 G/DL (ref 33.7–35.3)
MCV RBC AUTO: 92 FL (ref 81.4–97.8)
MONOCYTES # BLD AUTO: 0.74 K/UL (ref 0–0.85)
MONOCYTES NFR BLD AUTO: 11.6 % (ref 0–13.4)
NEUTROPHILS # BLD AUTO: 4.64 K/UL (ref 1.82–7.42)
NEUTROPHILS NFR BLD: 72.4 % (ref 44–72)
NRBC # BLD AUTO: 0 K/UL
NRBC BLD-RTO: 0 /100 WBC
PHOSPHATE SERPL-MCNC: 3.4 MG/DL (ref 2.5–4.5)
PLATELET # BLD AUTO: 180 K/UL (ref 164–446)
PMV BLD AUTO: 10.2 FL (ref 9–12.9)
POTASSIUM SERPL-SCNC: 3.8 MMOL/L (ref 3.6–5.5)
RBC # BLD AUTO: 4.12 M/UL (ref 4.7–6.1)
SODIUM SERPL-SCNC: 141 MMOL/L (ref 135–145)
WBC # BLD AUTO: 6.4 K/UL (ref 4.8–10.8)

## 2018-03-05 PROCEDURE — 92526 ORAL FUNCTION THERAPY: CPT

## 2018-03-05 PROCEDURE — 770006 HCHG ROOM/CARE - MED/SURG/GYN SEMI*

## 2018-03-05 PROCEDURE — 85025 COMPLETE CBC W/AUTO DIFF WBC: CPT

## 2018-03-05 PROCEDURE — 700102 HCHG RX REV CODE 250 W/ 637 OVERRIDE(OP): Performed by: INTERNAL MEDICINE

## 2018-03-05 PROCEDURE — 97530 THERAPEUTIC ACTIVITIES: CPT

## 2018-03-05 PROCEDURE — G8997 SWALLOW GOAL STATUS: HCPCS | Mod: CI

## 2018-03-05 PROCEDURE — A9270 NON-COVERED ITEM OR SERVICE: HCPCS | Performed by: INTERNAL MEDICINE

## 2018-03-05 PROCEDURE — 80069 RENAL FUNCTION PANEL: CPT

## 2018-03-05 PROCEDURE — 83735 ASSAY OF MAGNESIUM: CPT

## 2018-03-05 PROCEDURE — 92612 ENDOSCOPY SWALLOW (FEES) VID: CPT

## 2018-03-05 PROCEDURE — 700111 HCHG RX REV CODE 636 W/ 250 OVERRIDE (IP): Performed by: INTERNAL MEDICINE

## 2018-03-05 PROCEDURE — G8996 SWALLOW CURRENT STATUS: HCPCS | Mod: CK

## 2018-03-05 PROCEDURE — 36415 COLL VENOUS BLD VENIPUNCTURE: CPT

## 2018-03-05 PROCEDURE — 700101 HCHG RX REV CODE 250: Performed by: INTERNAL MEDICINE

## 2018-03-05 PROCEDURE — 99232 SBSQ HOSP IP/OBS MODERATE 35: CPT | Performed by: INTERNAL MEDICINE

## 2018-03-05 RX ORDER — CARVEDILOL 3.12 MG/1
6.25 TABLET ORAL 2 TIMES DAILY WITH MEALS
Qty: 60 TAB
Start: 2018-03-05

## 2018-03-05 RX ORDER — FUROSEMIDE 10 MG/ML
20 INJECTION INTRAMUSCULAR; INTRAVENOUS DAILY
Refills: 0
Start: 2018-03-06 | End: 2018-03-09

## 2018-03-05 RX ADMIN — LABETALOL HYDROCHLORIDE 10 MG: 5 INJECTION, SOLUTION INTRAVENOUS at 14:43

## 2018-03-05 RX ADMIN — CETIRIZINE HYDROCHLORIDE 10 MG: 10 TABLET, FILM COATED ORAL at 07:26

## 2018-03-05 RX ADMIN — LEVOTHYROXINE SODIUM 150 MCG: 150 TABLET ORAL at 07:26

## 2018-03-05 RX ADMIN — CARVEDILOL 6.25 MG: 6.25 TABLET, FILM COATED ORAL at 16:52

## 2018-03-05 RX ADMIN — FINASTERIDE 5 MG: 5 TABLET, FILM COATED ORAL at 07:26

## 2018-03-05 RX ADMIN — FUROSEMIDE 20 MG: 10 INJECTION, SOLUTION INTRAMUSCULAR; INTRAVENOUS at 07:26

## 2018-03-05 RX ADMIN — FLUTICASONE PROPIONATE 100 MCG: 50 SPRAY, METERED NASAL at 07:33

## 2018-03-05 RX ADMIN — ERGOCALCIFEROL 50000 UNITS: 1.25 CAPSULE ORAL at 07:26

## 2018-03-05 RX ADMIN — LABETALOL HYDROCHLORIDE 10 MG: 5 INJECTION, SOLUTION INTRAVENOUS at 04:29

## 2018-03-05 RX ADMIN — HEPARIN SODIUM 5000 UNITS: 5000 INJECTION, SOLUTION INTRAVENOUS; SUBCUTANEOUS at 22:06

## 2018-03-05 RX ADMIN — HEPARIN SODIUM 5000 UNITS: 5000 INJECTION, SOLUTION INTRAVENOUS; SUBCUTANEOUS at 07:26

## 2018-03-05 RX ADMIN — ASPIRIN 81 MG: 81 TABLET, CHEWABLE ORAL at 07:26

## 2018-03-05 RX ADMIN — CARVEDILOL 6.25 MG: 6.25 TABLET, FILM COATED ORAL at 07:26

## 2018-03-05 RX ADMIN — HYDRALAZINE HYDROCHLORIDE 50 MG: 25 TABLET, FILM COATED ORAL at 22:06

## 2018-03-05 RX ADMIN — ALLOPURINOL 100 MG: 100 TABLET ORAL at 07:26

## 2018-03-05 RX ADMIN — HYDRALAZINE HYDROCHLORIDE 50 MG: 25 TABLET, FILM COATED ORAL at 06:24

## 2018-03-05 ASSESSMENT — ENCOUNTER SYMPTOMS
BLURRED VISION: 0
HEARTBURN: 0
SPEECH CHANGE: 0
NAUSEA: 0
DIARRHEA: 0
WEAKNESS: 1
NECK PAIN: 0
VOMITING: 0
FALLS: 0
DOUBLE VISION: 0
FOCAL WEAKNESS: 0
ABDOMINAL PAIN: 0
SHORTNESS OF BREATH: 1
HEADACHES: 0
NERVOUS/ANXIOUS: 0
WHEEZING: 0
FEVER: 0
DIZZINESS: 0
PALPITATIONS: 0
PHOTOPHOBIA: 0

## 2018-03-05 ASSESSMENT — PAIN SCALES - GENERAL
PAINLEVEL_OUTOF10: 0

## 2018-03-05 ASSESSMENT — COGNITIVE AND FUNCTIONAL STATUS - GENERAL
MOVING TO AND FROM BED TO CHAIR: UNABLE
MOVING FROM LYING ON BACK TO SITTING ON SIDE OF FLAT BED: UNABLE
STANDING UP FROM CHAIR USING ARMS: TOTAL
TURNING FROM BACK TO SIDE WHILE IN FLAT BAD: UNABLE
CLIMB 3 TO 5 STEPS WITH RAILING: TOTAL
WALKING IN HOSPITAL ROOM: TOTAL
MOBILITY SCORE: 6
SUGGESTED CMS G CODE MODIFIER MOBILITY: CN

## 2018-03-05 ASSESSMENT — GAIT ASSESSMENTS: GAIT LEVEL OF ASSIST: UNABLE TO PARTICIPATE

## 2018-03-05 NOTE — PROGRESS NOTES
[facility] Hospitalist Progress Note    Date of Service: 3/5/2018    Chief Complaint  89 y.o. male admitted 2/24/2018 with cough x 1 week and weakness.     Interval Problem Update  2/27/18:  The patient is awake and alert and eating his lunch during my examination.  The patient states that he is getting better.     2/28/18:  The patient appears in happy mood.  Caregiver at bedside.    3/1/18:  No acute event overnight.  The patient is sitting comfortably in chair and eating his meal.  Attempted to call the patient's son multiple times but no answer.    3/2/18.  Seen and examined the patient in morning.  He appears more sleepy today. However, he can easily be awaken and has no complaint.  Discussed with the patient's son at bedside.  He is agreeable with discharging the patient to a post-acute facility.     3/3/18:  The patient appears some what depressed this morning.   Otherwise, he denies any chest pain or shortness of breath.    3/4/18:  The patient reports show shortness of breath with exertion. Otherwise, no new complaints.       3/5/18:  The patient seems depressed but otherwise no acute event overnight. Dicussed with the patient's son over the telephone regarding the patient's condition.    Consultants/Specialty  None.    Disposition  SNF      Review of Systems   Constitutional: Negative for fever.   HENT: Positive for hearing loss. Negative for congestion.    Eyes: Negative for blurred vision, double vision and photophobia.   Respiratory: Positive for shortness of breath. Negative for wheezing.    Cardiovascular: Negative for chest pain and palpitations.   Gastrointestinal: Negative for abdominal pain, diarrhea, heartburn, nausea and vomiting.   Musculoskeletal: Negative for falls and neck pain.   Neurological: Positive for weakness. Negative for dizziness, speech change, focal weakness and headaches.   Psychiatric/Behavioral: The patient is not nervous/anxious.       Physical Exam  Laboratory/Imaging    Hemodynamics  Temp (24hrs), Av.6 °C (97.9 °F), Min:36.1 °C (96.9 °F), Max:36.9 °C (98.5 °F)   Temperature: 36.1 °C (96.9 °F)  Pulse  Av.9  Min: 52  Max: 122 Heart Rate (Monitored): 74  Blood Pressure : 128/73      Respiratory      Respiration: 14, Pulse Oximetry: 94 %     Work Of Breathing / Effort: Mild  RUL Breath Sounds: Rhonchi, RML Breath Sounds: Rhonchi, RLL Breath Sounds: Rhonchi, ETHAN Breath Sounds: Rhonchi, LLL Breath Sounds: Rhonchi    Fluids  No intake or output data in the 24 hours ending 18 0903    Nutrition  Orders Placed This Encounter   Procedures   • Diet Order     Standing Status:   Standing     Number of Occurrences:   1     Order Specific Question:   Diet:     Answer:   Regular [1]     Order Specific Question:   Texture/Fiber modifications:     Answer:   Dysphagia 2(Pureed/Chopped)specify fluid consistency(question 6) [2]     Order Specific Question:   Consistency/Fluid modifications:     Answer:   Thin Liquids [3]     Physical Exam   Constitutional: Vital signs are normal. He is cooperative. No distress.   Frail elderly   HENT:   Head: Normocephalic.   Right Ear: Hearing normal.   Left Ear: Hearing normal.   Nose: Nose normal.   Mouth/Throat: Oropharynx is clear and moist and mucous membranes are normal.   Eyes: EOM and lids are normal. Right eye exhibits no discharge. Left eye exhibits no discharge. No scleral icterus.   Neck: Phonation normal. No JVD present.   Cardiovascular: Regular rhythm and intact distal pulses.    Murmur heard.  Pulmonary/Chest: Effort normal. No respiratory distress. He has no wheezes.   Diminished breath sounds in lung bases.    Abdominal: Soft. He exhibits no distension. There is no tenderness.   Neurological: He is alert.   Skin: Skin is warm and dry. He is not diaphoretic.   Psychiatric: His affect is blunt.   Nursing note and vitals reviewed.      Recent Labs      18   0320  18   0225  18   0455   WBC  5.4  10.0  6.4   RBC  3.86*   4.57*  4.12*   HEMOGLOBIN  11.8*  14.5  12.8*   HEMATOCRIT  35.1*  42.8  37.9*   MCV  90.9  93.7  92.0   MCH  30.6  31.7  31.1   MCHC  33.6*  33.9  33.8   RDW  45.5  47.6  46.3   PLATELETCT  213  248  180   MPV  10.1  10.0  10.2     Recent Labs      03/03/18   0320  03/04/18   0225  03/05/18   0414   SODIUM  144  144  141   POTASSIUM  3.6  3.7  3.8   CHLORIDE  116*  113*  112   CO2  19*  20  19*   GLUCOSE  160*  131*  138*   BUN  50*  45*  43*   CREATININE  1.62*  1.63*  1.55*   CALCIUM  9.0  9.6  9.0         Recent Labs      03/03/18   0320   BNPBTYPENAT  656*              Assessment/Plan     Acute renal failure superimposed on stage 3 chronic kidney disease (CMS-HCC)- (present on admission)   Assessment & Plan    - renal function continue to improve even after stopping iv hydration.  -- renal ultrasound negative for hydronephrosis.  -- normal uric acid and ck level.  - avoid nephrotoxins  - renally dose medication as necessary  -- stopped iv hydration as the patient's last chest xray indicate possible vascular congestion.  -- hold losartan and switch to alternative antihypertensive therapy.        DNR (do not resuscitate)- (present on admission)   Assessment & Plan    - Patient speaks about his time [place] getting shorter   - Palliative care consulted        Lactic acidosis- (present on admission)   Assessment & Plan    - no fevers  - BC negative          Aortic stenosis- (present on admission)   Assessment & Plan    - last ECHO on file here was June 2009: showed EF 65% with diastolic dysfunction and mild aortic stenosis and mild tricuspid regurg  - latest ECHO showed mild aortic stenosis with EF 60%              Depression   Assessment & Plan    If the patient's depressed mood continues to persists, consider SSRI.        Shortness of breath   Assessment & Plan    afer receiving large amount of fluid in the last few days, the patient is slightly fluid overloaded.  Will continue with lasix for now.  The patient  does have mild aortic stenosis per echocardiogram.  This may be contributing factor.        Metabolic acidosis   Assessment & Plan     maybe due to renal dysfunction in combination with respiratory dysfunction.  Discontinue sodium bicarb as metabolic acidosis resolved.        Dementia   Assessment & Plan    Appears to be at baseline.        Gout- (present on admission)   Assessment & Plan    Allopurinol        HTN (hypertension)- (present on admission)   Assessment & Plan    Hold Losartan and HCTZ   resume as clinically appropriate  BP is high today.  Continue hydralazine and increase carvedilol to better control bp.        Hypothyroidism- (present on admission)   Assessment & Plan    - chronic on Levothyroxine        History of rectal cancer- (present on admission)   Assessment & Plan    - with presence of colostomy.  The patient's the patient is status post surgery 15 years ago and he has not been told to have recurrence of cancer in the last 15 years.        SSS (sick sinus syndrome) (CMS-HCC)- (present on admission)   Assessment & Plan    - history of  - presence of AICD/PPM        Esophageal reflux- (present on admission)   Assessment & Plan    Held PPI to limit risk of C. diff with antibiotic.  With discontinuation of antibiotic, will resume ppi.        Benign prostatic hypertrophy- (present on admission)   Assessment & Plan    Continue finasteride          Quality-Core Measures   DVT prophylaxis - mechanical:  IRAJs      Marcus Webb D.O.

## 2018-03-05 NOTE — ASSESSMENT & PLAN NOTE
afer receiving large amount of fluid in the last few days, the patient is slightly fluid overloaded.  Will continue with lasix for now.  The patient does have mild aortic stenosis per echocardiogram.  This may be contributing factor.

## 2018-03-05 NOTE — PROGRESS NOTES
Pt showing signs of aspiration with thin liquids while taking medications. SLP notified and will re-evaluate.

## 2018-03-05 NOTE — DISCHARGE PLANNING
Medical Social Worker    MATEO was informed by [facility], that [facility] has accepted this pt.     MATEO called pt's son who stated he was currently in the hospital meeting with Palliative RN Hi.     SW joined Palliative Care Meeting and discussed pt with them. MATEO informed pt's son/POA, that pt has been accepted at [facility] for SNF w/ therapies. The current discussion was that they were pending to see if pt is aspirating. Pt's son was educated about Hospice by Palliative RN Hi. Pt's son has already set up [facility] for pt in home, but they do not provide 24/7 supervision. SW explained that if pt is to go home on Hospice that pt would still require this supervision. Palliative RN educated pt's son about in home care takers and that Hospice does not provide this for 24/7. Pt's son stated that at this time to please postpone pt's transfer to [facility] as he would like to see if pt is aspirating or not.    MATEO updated [facility].

## 2018-03-05 NOTE — DISCHARGE PLANNING
Medical Social Worker    SW requested that [facility] please f/u with [facility], as pt is medically cleared.

## 2018-03-05 NOTE — PROGRESS NOTES
Received report from night nurse at the bedside. Assume care of Pt at 0700. Assessment completed Pt A&O X 1 to self arvin. Pt denies numbness and tingling, Pt denies complaints of pain,  Assist of one with fww. Pt in bed, bed in lowest position, call light in place, bed alarm is on, treaded slipper socks on.

## 2018-03-05 NOTE — THERAPY
"Pt w/decreased mobility, strength, balance, and activity tolerance. Pt required physical assist for all activities and required extra time and facilitation for initation of extremity movement. Pt intermittienly answers yes/no questions w/head shakes/nods. Pt appears highly deconditioned. Will continue to follow for appropriateness.    Physical Therapy Treatment completed.   Bed Mobility:  Supine to Sit: Total Assist  Transfers: Sit to Stand: Unable to Participate  Gait: Level Of Assist: Unable to Participate       Plan of Care: Will benefit from Physical Therapy 3 times per week    See \"Rehab Therapy-Acute\" Patient Summary Report for complete documentation.       "

## 2018-03-05 NOTE — CARE PLAN
Problem: Safety  Goal: Will remain free from injury  Outcome: PROGRESSING AS EXPECTED  Patients side rails up x 2 call light within reach bed alarm and hourly rounding.

## 2018-03-05 NOTE — DISCHARGE PLANNING
[facility] has accepted patient. Arranged patient's transport to [facility] at 1500 via [facility]. W[facility](MATEO) notified of transport time.

## 2018-03-05 NOTE — DISCHARGE PLANNING
[facility] SN[facility] has declined patient due to no skilled need and no dc plan. Attempted to follow up with [facility], however, no answer. Voicemail left.

## 2018-03-05 NOTE — THERAPY
"Speech Language Therapy dysphagia treatment completed.   Functional Status:  Pt seen for dysphagia follow-up and presents with delayed and consistent cough with and without food presentations.  Cannot rule out penetration or aspiration during the swallow.  Discussed with son at bedside and FEES indicated to further assess airway protection and efficacy of strategies during po.  Recommendations: NPO with FEES order requested; can be completed later today  Plan of Care: Will benefit from Speech Therapy 3 times per week  Post-Acute Therapy: TBD    See \"Rehab Therapy-Acute\" Patient Summary Report for complete documentation.     "

## 2018-03-05 NOTE — DISCHARGE PLANNING
Medical Social Worker    Pt discussed in rounds. SW informed attending MD of Palliative Conference with pt's son/POA. SW requested a hospice order.     MATEO was updated by Palliative RN Hi that pt's son has confirmed that he would like to pursue home with hospice and provided pt's son with a hospice CHOICE form.

## 2018-03-05 NOTE — THERAPY
"  Speech Language Therapy FEES completed.  Functional Status: Pt with overt aspiration intermittent with thin liquids.  Significant delay in oral transit of textured solids.  Penetration with pudding.  At risk for descending aspiration unless strategies and modified diet are used.  Son educated at bedside re status and strategies. Of note, both nares were crusted with dried blood and pt could use humidification or txment to improve.    Recommendations - Diet: NTFL with sm sips/bites.  Avoid pudding.  Nemo to aide is sip size; prolonged sustained sip is not advised.                            Strategies: Direct supervision during meals, Assistance needed for meal tray set-up, No Straws and Head of Bed at 90 Degrees                          Medication Administration: Crushed in puree  Plan of Care: Will benefit from Speech Therapy 3 times per week  Post-Acute Therapy: TBD; continue SLP services    See \"Rehab Therapy-Acute\" Patient Summary Report for complete documentation.   "

## 2018-03-06 LAB
ALBUMIN SERPL BCP-MCNC: 3.4 G/DL (ref 3.2–4.9)
ALBUMIN/GLOB SERPL: 1.2 G/DL
ALP SERPL-CCNC: 41 U/L (ref 30–99)
ALT SERPL-CCNC: 8 U/L (ref 2–50)
ANION GAP SERPL CALC-SCNC: 9 MMOL/L (ref 0–11.9)
AST SERPL-CCNC: 24 U/L (ref 12–45)
BASOPHILS # BLD AUTO: 0.3 % (ref 0–1.8)
BASOPHILS # BLD: 0.03 K/UL (ref 0–0.12)
BILIRUB SERPL-MCNC: 1 MG/DL (ref 0.1–1.5)
BUN SERPL-MCNC: 41 MG/DL (ref 8–22)
CALCIUM SERPL-MCNC: 9.1 MG/DL (ref 8.5–10.5)
CHLORIDE SERPL-SCNC: 109 MMOL/L (ref 96–112)
CO2 SERPL-SCNC: 25 MMOL/L (ref 20–33)
CREAT SERPL-MCNC: 1.54 MG/DL (ref 0.5–1.4)
EOSINOPHIL # BLD AUTO: 0.05 K/UL (ref 0–0.51)
EOSINOPHIL NFR BLD: 0.4 % (ref 0–6.9)
ERYTHROCYTE [DISTWIDTH] IN BLOOD BY AUTOMATED COUNT: 46.3 FL (ref 35.9–50)
GLOBULIN SER CALC-MCNC: 2.8 G/DL (ref 1.9–3.5)
GLUCOSE SERPL-MCNC: 122 MG/DL (ref 65–99)
HCT VFR BLD AUTO: 41.9 % (ref 42–52)
HGB BLD-MCNC: 13.9 G/DL (ref 14–18)
IMM GRANULOCYTES # BLD AUTO: 0.42 K/UL (ref 0–0.11)
IMM GRANULOCYTES NFR BLD AUTO: 3.6 % (ref 0–0.9)
LYMPHOCYTES # BLD AUTO: 1.4 K/UL (ref 1–4.8)
LYMPHOCYTES NFR BLD: 12.1 % (ref 22–41)
MCH RBC QN AUTO: 30.5 PG (ref 27–33)
MCHC RBC AUTO-ENTMCNC: 33.2 G/DL (ref 33.7–35.3)
MCV RBC AUTO: 92.1 FL (ref 81.4–97.8)
MONOCYTES # BLD AUTO: 1.21 K/UL (ref 0–0.85)
MONOCYTES NFR BLD AUTO: 10.5 % (ref 0–13.4)
NEUTROPHILS # BLD AUTO: 8.42 K/UL (ref 1.82–7.42)
NEUTROPHILS NFR BLD: 73.1 % (ref 44–72)
NRBC # BLD AUTO: 0 K/UL
NRBC BLD-RTO: 0 /100 WBC
PLATELET # BLD AUTO: 258 K/UL (ref 164–446)
PMV BLD AUTO: 10.4 FL (ref 9–12.9)
POTASSIUM SERPL-SCNC: 3.3 MMOL/L (ref 3.6–5.5)
PROT SERPL-MCNC: 6.2 G/DL (ref 6–8.2)
RBC # BLD AUTO: 4.55 M/UL (ref 4.7–6.1)
SODIUM SERPL-SCNC: 143 MMOL/L (ref 135–145)
WBC # BLD AUTO: 11.5 K/UL (ref 4.8–10.8)

## 2018-03-06 PROCEDURE — 700102 HCHG RX REV CODE 250 W/ 637 OVERRIDE(OP): Performed by: FAMILY MEDICINE

## 2018-03-06 PROCEDURE — 700111 HCHG RX REV CODE 636 W/ 250 OVERRIDE (IP): Performed by: INTERNAL MEDICINE

## 2018-03-06 PROCEDURE — 700102 HCHG RX REV CODE 250 W/ 637 OVERRIDE(OP): Performed by: INTERNAL MEDICINE

## 2018-03-06 PROCEDURE — 36415 COLL VENOUS BLD VENIPUNCTURE: CPT

## 2018-03-06 PROCEDURE — 85025 COMPLETE CBC W/AUTO DIFF WBC: CPT

## 2018-03-06 PROCEDURE — 770006 HCHG ROOM/CARE - MED/SURG/GYN SEMI*

## 2018-03-06 PROCEDURE — 99232 SBSQ HOSP IP/OBS MODERATE 35: CPT | Performed by: FAMILY MEDICINE

## 2018-03-06 PROCEDURE — A9270 NON-COVERED ITEM OR SERVICE: HCPCS | Performed by: INTERNAL MEDICINE

## 2018-03-06 PROCEDURE — 80053 COMPREHEN METABOLIC PANEL: CPT

## 2018-03-06 PROCEDURE — A9270 NON-COVERED ITEM OR SERVICE: HCPCS | Performed by: FAMILY MEDICINE

## 2018-03-06 RX ORDER — POTASSIUM CHLORIDE 20 MEQ/1
20 TABLET, EXTENDED RELEASE ORAL 2 TIMES DAILY
Status: COMPLETED | OUTPATIENT
Start: 2018-03-06 | End: 2018-03-06

## 2018-03-06 RX ADMIN — HEPARIN SODIUM 5000 UNITS: 5000 INJECTION, SOLUTION INTRAVENOUS; SUBCUTANEOUS at 22:10

## 2018-03-06 RX ADMIN — HEPARIN SODIUM 5000 UNITS: 5000 INJECTION, SOLUTION INTRAVENOUS; SUBCUTANEOUS at 09:02

## 2018-03-06 RX ADMIN — HYDRALAZINE HYDROCHLORIDE 50 MG: 25 TABLET, FILM COATED ORAL at 22:10

## 2018-03-06 RX ADMIN — POTASSIUM CHLORIDE 20 MEQ: 1500 TABLET, EXTENDED RELEASE ORAL at 22:10

## 2018-03-06 RX ADMIN — HYDRALAZINE HYDROCHLORIDE 50 MG: 25 TABLET, FILM COATED ORAL at 05:59

## 2018-03-06 RX ADMIN — CETIRIZINE HYDROCHLORIDE 10 MG: 10 TABLET, FILM COATED ORAL at 09:02

## 2018-03-06 RX ADMIN — LEVOTHYROXINE SODIUM 150 MCG: 150 TABLET ORAL at 05:59

## 2018-03-06 RX ADMIN — CARVEDILOL 6.25 MG: 6.25 TABLET, FILM COATED ORAL at 09:02

## 2018-03-06 RX ADMIN — FINASTERIDE 5 MG: 5 TABLET, FILM COATED ORAL at 09:02

## 2018-03-06 RX ADMIN — ALLOPURINOL 100 MG: 100 TABLET ORAL at 09:02

## 2018-03-06 RX ADMIN — POTASSIUM CHLORIDE 20 MEQ: 1500 TABLET, EXTENDED RELEASE ORAL at 17:55

## 2018-03-06 RX ADMIN — ASPIRIN 81 MG: 81 TABLET, CHEWABLE ORAL at 09:02

## 2018-03-06 RX ADMIN — HYDRALAZINE HYDROCHLORIDE 50 MG: 25 TABLET, FILM COATED ORAL at 15:00

## 2018-03-06 RX ADMIN — FUROSEMIDE 20 MG: 10 INJECTION, SOLUTION INTRAMUSCULAR; INTRAVENOUS at 09:02

## 2018-03-06 RX ADMIN — CARVEDILOL 6.25 MG: 6.25 TABLET, FILM COATED ORAL at 17:55

## 2018-03-06 ASSESSMENT — PAIN SCALES - GENERAL
PAINLEVEL_OUTOF10: 0
PAINLEVEL_OUTOF10: 0

## 2018-03-06 NOTE — DISCHARGE SUMMARY
CHIEF COMPLAINT ON ADMISSION  Chief Complaint   Patient presents with   • Cold Symptoms     cough worsening x 1 week, increasing weakness       CODE STATUS  DNR    HPI & HOSPITAL COURSE  This is a 89 y.o. year old male here with flulike symptoms. Patient lives by himself. He has Senior helpers coming in to help him with his everyday activities.   The patient initially had acute hypoxemic respiratory failure due to acute bronchitis and Acute renal failure superimposed on CKD stage 3 which was thought to be due to dehydration.   The patient's baseline creatinine was 1.46.  The patient was given steroid, svn, antibiotic for acute bronchitis along with IV fluid for hydration.   Gradually, the patient's clinical condition improved and he was wean off oxygen therapy and he completed his antibiotic and steroid therapy.  The patient's renal function also improved to near baseline.  The patient did have some degree of fluid overload and is now receiving lasix for short duration.    Therefore, he is discharged in fair and stable condition for further post-acute management.     SPECIFIC OUTPATIENT FOLLOW-UP  Primary care provider.    DISCHARGE PROBLEM LIST  Active Problems:    Acute renal failure superimposed on stage 3 chronic kidney disease (CMS-[facility]) POA: Yes    Aortic stenosis POA: Yes    Lactic acidosis POA: Yes    DNR (do not resuscitate) POA: Yes    Benign prostatic hypertrophy POA: Yes    Esophageal reflux POA: Yes    SSS (sick sinus syndrome) (CMS-HCC) POA: Yes    History of rectal cancer (Chronic) POA: Yes    Hypothyroidism (Chronic) POA: Yes    HTN (hypertension) POA: Yes    Gout (Chronic) POA: Yes    Dementia POA: Unknown    Metabolic acidosis POA: Unknown    Depression POA: Unknown  Resolved Problems:    Acute hypoxemic respiratory failure (CMS-[facility]) POA: Yes    Reactive airway disease POA: Yes    Leukocytosis POA: Yes    Acute bronchitis POA: Yes    Shortness of breath POA: Unknown      FOLLOW UP  Future  Appointments  Date Time Provider Department Center   7/25/2018 10:30 AM PACER CHECK-[facility] None   7/25/2018 10:45 AM Kathi Bush M.D. [facility] None     Sandra Kwon M.D.  [address] [place] [id number]  [phone]    In 1 week        MEDICATIONS ON DISCHARGE   Richard Callahan   Home Medication Instructions CALOS:[id number]    Printed on:03/05/18 0665   Medication Information                      acetaminophen (TYLENOL) 325 MG Tab  Take 2 Tabs by mouth every 6 hours as needed (Mild Pain; (Pain scale 1-3); Temp greater than 100.5 F).             allopurinol (ZYLOPRIM) 100 MG Tab  Take 1 Tab by mouth every day.             aspirin (ASA) 81 MG Chew Tab chewable tablet  Take 1 Tab by mouth every day.             carvedilol (COREG) 3.125 MG Tab  Take 2 Tabs by mouth 2 times a day, with meals.             finasteride (PROSCAR) 5 MG Tab  Take 1 Tab by mouth every day.             furosemide (LASIX) 10 MG/ML Solution  2 mL by Intravenous route every day for 3 days.             heparin 5000 UNIT/ML Solution  Inject 1 mL as instructed every 12 hours.             hydrALAZINE (APRESOLINE) 50 MG Tab  Take 1 Tab by mouth every 8 hours.             ipratropium-albuterol (DUONEB) 0.5-2.5 (3) MG/3ML nebulizer solution  3 mL by Nebulization route every 6 hours as needed for Shortness of Breath.             levothyroxine (SYNTHROID) 150 MCG Tab  Take 1 Tab by mouth Every morning on an empty stomach.             omeprazole (PRILOSEC) 20 MG delayed-release capsule  Take 1 Cap by mouth every day.             vitamin D, Ergocalciferol, (DRISDOL) 98201 units Cap capsule  Take 1 Cap by mouth every Monday.                 DIET  Orders Placed This Encounter   Procedures   • Diet Order     Standing Status:   Standing     Number of Occurrences:   1     Order Specific Question:   Diet:     Answer:   Regular [1]     Order Specific Question:   Diet:     Answer:   Full Liquid [11]     Order Specific Question:   Consistency/Fluid  modifications:     Answer:   Nectar Thick [2]       ACTIVITY  As tolerated and directed by skilled nursing.  Class 2 - comfortable at rest but have symptoms with ordinary physcial activity.    LINES, DRAINS, AND WOUNDS  This is an automated list. Peripheral IVs will be removed prior to discharge.  PIV Group Right Forearm 24g (Active)   Line Secured Taped;Transparent 3/5/2018  7:39 AM   Site Condition / Description Assessed;Patent 3/5/2018  7:39 AM   Dressing Type / Description Transparent 3/5/2018  7:39 AM   Dressing Status Observed 3/5/2018  7:39 AM   Saline Locked Yes 3/5/2018  7:39 AM   Infiltration Grading Used by [facility] 0 3/5/2018  7:39 AM   Phlebitis Scale (Used by [facility]) 0 3/5/2018  7:39 AM     Fecal Ostomy Group LLQ Colostomy (Active)   Stoma Appearance Red 3/5/2018  7:39 AM   Collection Device Content Small;Flatus;Loose 3/5/2018  7:39 AM   Collection Device Changed No 3/5/2018  7:39 AM   Peristomal Skin Integrity Intact 3/5/2018  7:39 AM   Skin Care Area cleansed 3/2/2018 10:00 PM   Stoma Size (Inches) 1 3/5/2018  7:39 AM   Stoma Shape Round 3/5/2018  7:39 AM   Os Location 1 o'clock 3/5/2018  7:39 AM   Collection Device Size (Inches) 2.5 3/5/2018  7:39 AM   Care Provided by Staff 3/5/2018  7:39 AM   Time Spent with Patient (mins) 30 2/25/2018 12:00 PM      Wound POA Other (comment) Coccyx (Active)   Wound Bed Pink 3/5/2018  7:00 AM   Drainage  None 3/5/2018  7:00 AM   Periwound Skin Normal;Intact 3/5/2018  7:00 AM   Cleansing Not Applicable 3/5/2018  7:00 AM   Dressing Options Mepilex 3/5/2018  7:00 AM   Dressing Status / Change Clean;Dry;Intact 3/5/2018  7:00 AM   Weekly Photo (Inpatient Only) 03/01/18 2/28/2018  8:00 PM                  MENTAL STATUS ON TRANSFER  Level of Consciousness: Lethargic  Orientation : Disoriented to Event, Disoriented to Place, Disoriented to Time  Speech: Speech Clear    CONSULTATIONS  Palliative care.    PROCEDURES  None.    LABORATORY  Lab Results   Component Value  Date/Time    SODIUM 141 03/05/2018 04:14 AM    POTASSIUM 3.8 03/05/2018 04:14 AM    CHLORIDE 112 03/05/2018 04:14 AM    CO2 19 (L) 03/05/2018 04:14 AM    GLUCOSE 138 (H) 03/05/2018 04:14 AM    BUN 43 (H) 03/05/2018 04:14 AM    CREATININE 1.55 (H) 03/05/2018 04:14 AM    CREATININE 1.0 09/04/2008 09:30 AM        Lab Results   Component Value Date/Time    WBC 6.4 03/05/2018 04:55 AM    HEMOGLOBIN 12.8 (L) 03/05/2018 04:55 AM    HEMATOCRIT 37.9 (L) 03/05/2018 04:55 AM    PLATELETCT 180 03/05/2018 04:55 AM      Physical Exam   Constitutional: Vital signs are normal. He is cooperative. He has a sickly appearance. No distress.   HENT:   Head: Normocephalic.   Right Ear: Hearing normal.   Left Ear: Hearing normal.   Nose: Nose normal.   Mouth/Throat: Oropharynx is clear and moist and mucous membranes are normal.   Eyes: Lids are normal. No Scleral icterus is present.   Neck: Phonation normal. No JVD present.   Cardiovascular: Regular rhythm and intact distal pulses.    Murmur heard.  Pulmonary/Chest: Effort normal and breath sounds normal. He has no wheezes. He has no rales.   Neurological: He is alert.   Skin: He is not diaphoretic.     Total time of the discharge process exceeds 36 minutes.

## 2018-03-06 NOTE — DIETARY
Nutrition Services: Weekly Re-Screen Poor PO  Pt is currently on regular, full liquid, nectar thick diet. Per chart pt PO < 25% of meals. SLP saw pt and diet downgraded. Per SLP avoid pudding. Will added nectar thick high protein milkshakes with meals . Wt 3/2: 62.5 kg via bed scale.     Recommendations/Plan:  1. Encourage intake of meals  2. Document intake of all meals as % taken in ADL's to provide interdisciplinary communication across all shifts.   3. Monitor weight.  4. Nutrition rep will continue to see patient for ongoing meal and snack preferences.  5. Obtain supplement order per RD as needed.    RD following

## 2018-03-06 NOTE — CARE PLAN
Problem: Nutritional:  Goal: Achieve adequate nutritional intake  Patient will consume 25-50% of meals  Outcome: NOT MET

## 2018-03-06 NOTE — DISCHARGE PLANNING
Medical Social Work    Referral: discharge planning    Intervention: MATEO LM with pt's son Richard to discuss hospice choice.     Plan: SW to remain available.

## 2018-03-07 LAB
ALBUMIN SERPL BCP-MCNC: 3.3 G/DL (ref 3.2–4.9)
ALBUMIN/GLOB SERPL: 1.2 G/DL
ALP SERPL-CCNC: 40 U/L (ref 30–99)
ALT SERPL-CCNC: 11 U/L (ref 2–50)
ANION GAP SERPL CALC-SCNC: 10 MMOL/L (ref 0–11.9)
AST SERPL-CCNC: 16 U/L (ref 12–45)
BILIRUB SERPL-MCNC: 0.9 MG/DL (ref 0.1–1.5)
BUN SERPL-MCNC: 40 MG/DL (ref 8–22)
CALCIUM SERPL-MCNC: 9.1 MG/DL (ref 8.5–10.5)
CHLORIDE SERPL-SCNC: 113 MMOL/L (ref 96–112)
CO2 SERPL-SCNC: 22 MMOL/L (ref 20–33)
CREAT SERPL-MCNC: 1.53 MG/DL (ref 0.5–1.4)
GLOBULIN SER CALC-MCNC: 2.7 G/DL (ref 1.9–3.5)
GLUCOSE SERPL-MCNC: 133 MG/DL (ref 65–99)
POTASSIUM SERPL-SCNC: 3.7 MMOL/L (ref 3.6–5.5)
PROT SERPL-MCNC: 6 G/DL (ref 6–8.2)
SODIUM SERPL-SCNC: 145 MMOL/L (ref 135–145)

## 2018-03-07 PROCEDURE — 97116 GAIT TRAINING THERAPY: CPT

## 2018-03-07 PROCEDURE — A9270 NON-COVERED ITEM OR SERVICE: HCPCS | Performed by: INTERNAL MEDICINE

## 2018-03-07 PROCEDURE — 700102 HCHG RX REV CODE 250 W/ 637 OVERRIDE(OP): Performed by: INTERNAL MEDICINE

## 2018-03-07 PROCEDURE — 97535 SELF CARE MNGMENT TRAINING: CPT

## 2018-03-07 PROCEDURE — 97530 THERAPEUTIC ACTIVITIES: CPT

## 2018-03-07 PROCEDURE — 99232 SBSQ HOSP IP/OBS MODERATE 35: CPT | Performed by: FAMILY MEDICINE

## 2018-03-07 PROCEDURE — 700111 HCHG RX REV CODE 636 W/ 250 OVERRIDE (IP): Performed by: INTERNAL MEDICINE

## 2018-03-07 PROCEDURE — 92526 ORAL FUNCTION THERAPY: CPT

## 2018-03-07 PROCEDURE — 302111 WAFER OST 2.25IN N IMG RD 2 PC (BARRIER): Performed by: FAMILY MEDICINE

## 2018-03-07 PROCEDURE — 36415 COLL VENOUS BLD VENIPUNCTURE: CPT

## 2018-03-07 PROCEDURE — 770006 HCHG ROOM/CARE - MED/SURG/GYN SEMI*

## 2018-03-07 PROCEDURE — 80053 COMPREHEN METABOLIC PANEL: CPT

## 2018-03-07 PROCEDURE — 302098 PASTE RING (FLAT): Performed by: FAMILY MEDICINE

## 2018-03-07 RX ADMIN — CETIRIZINE HYDROCHLORIDE 10 MG: 10 TABLET, FILM COATED ORAL at 08:48

## 2018-03-07 RX ADMIN — FUROSEMIDE 20 MG: 10 INJECTION, SOLUTION INTRAMUSCULAR; INTRAVENOUS at 08:47

## 2018-03-07 RX ADMIN — LEVOTHYROXINE SODIUM 150 MCG: 150 TABLET ORAL at 06:32

## 2018-03-07 RX ADMIN — HEPARIN SODIUM 5000 UNITS: 5000 INJECTION, SOLUTION INTRAVENOUS; SUBCUTANEOUS at 08:47

## 2018-03-07 RX ADMIN — CARVEDILOL 6.25 MG: 6.25 TABLET, FILM COATED ORAL at 08:48

## 2018-03-07 RX ADMIN — HYDRALAZINE HYDROCHLORIDE 50 MG: 25 TABLET, FILM COATED ORAL at 06:31

## 2018-03-07 RX ADMIN — ASPIRIN 81 MG: 81 TABLET, CHEWABLE ORAL at 08:48

## 2018-03-07 RX ADMIN — HEPARIN SODIUM 5000 UNITS: 5000 INJECTION, SOLUTION INTRAVENOUS; SUBCUTANEOUS at 20:20

## 2018-03-07 RX ADMIN — CARVEDILOL 6.25 MG: 6.25 TABLET, FILM COATED ORAL at 18:12

## 2018-03-07 RX ADMIN — ALLOPURINOL 100 MG: 100 TABLET ORAL at 08:48

## 2018-03-07 RX ADMIN — HYDRALAZINE HYDROCHLORIDE 50 MG: 25 TABLET, FILM COATED ORAL at 14:47

## 2018-03-07 RX ADMIN — FINASTERIDE 5 MG: 5 TABLET, FILM COATED ORAL at 08:48

## 2018-03-07 RX ADMIN — HYDRALAZINE HYDROCHLORIDE 50 MG: 25 TABLET, FILM COATED ORAL at 20:20

## 2018-03-07 ASSESSMENT — COGNITIVE AND FUNCTIONAL STATUS - GENERAL
DAILY ACTIVITIY SCORE: 16
DRESSING REGULAR UPPER BODY CLOTHING: A LITTLE
MOVING TO AND FROM BED TO CHAIR: A LITTLE
WALKING IN HOSPITAL ROOM: A LITTLE
TURNING FROM BACK TO SIDE WHILE IN FLAT BAD: A LOT
DRESSING REGULAR LOWER BODY CLOTHING: A LOT
SUGGESTED CMS G CODE MODIFIER MOBILITY: CK
TOILETING: A LITTLE
STANDING UP FROM CHAIR USING ARMS: A LITTLE
MOBILITY SCORE: 16
CLIMB 3 TO 5 STEPS WITH RAILING: A LOT
EATING MEALS: A LITTLE
PERSONAL GROOMING: A LITTLE
MOVING FROM LYING ON BACK TO SITTING ON SIDE OF FLAT BED: A LITTLE
SUGGESTED CMS G CODE MODIFIER DAILY ACTIVITY: CK
HELP NEEDED FOR BATHING: A LOT

## 2018-03-07 ASSESSMENT — PAIN SCALES - GENERAL
PAINLEVEL_OUTOF10: 0

## 2018-03-07 ASSESSMENT — GAIT ASSESSMENTS
GAIT LEVEL OF ASSIST: CONTACT GUARD ASSIST
ASSISTIVE DEVICE: FRONT WHEEL WALKER
DISTANCE (FEET): 75

## 2018-03-07 NOTE — DISCHARGE PLANNING
Medical Social Worker    Care Conference occurred in pt's room.    Attendees:  Pt's Son, Richard Berumenk  Patient  MD Willa    Intervention:  Discussed with pt and pt's son appropriateness for hospice. Attending MD does not feel that pt is appropriate for hospice at this time and discussed pt's wavering mental status due to dementia. Son stated that he would prefer SNF with therapy but will keep in mind hospice discussion from previous meetings. He stated that he would like pt to go to [facility] as originally planned for therapeutic intervention. MATEO requested that PT work with pt one additional time for updated therapy notes for SNF insurance auth.    SW reached out to PT to please complete a new evaluation with pt.     Plan:  Send new PT note to [facility] upon completion. DC to SNF upon acceptance and medical clearance.

## 2018-03-07 NOTE — PROGRESS NOTES
[facility] Hospitalist Progress Note    Date of Service: 3/7/2018    Chief Complaint  89 y.o. male admitted 2018 with cough x 1 week and weakness.     Interval Problem Update  Patient was sleeping this morning. No new complaints per RN. He wakes up to verbal stimulus. Still disoriented to time. Follows simple commands appropriately.  Consultants/Specialty  None.    Disposition  SNF      Review of Systems   Unable to perform ROS: Dementia      Physical Exam  Laboratory/Imaging   Hemodynamics  Temp (24hrs), Av.3 °C (97.4 °F), Min:35.9 °C (96.7 °F), Max:36.7 °C (98 °F)   Temperature: 36.7 °C (98 °F)  Pulse  Av.3  Min: 52  Max: 122    Blood Pressure : 142/65      Respiratory      Respiration: 19, Pulse Oximetry: 99 %     Work Of Breathing / Effort: Mild  RUL Breath Sounds: Crackles, RML Breath Sounds: Crackles, RLL Breath Sounds: Crackles, ETHAN Breath Sounds: Crackles, LLL Breath Sounds: Crackles    Fluids    Intake/Output Summary (Last 24 hours) at 18 1414  Last data filed at 18 0400   Gross per 24 hour   Intake                0 ml   Output              375 ml   Net             -375 ml       Nutrition  Orders Placed This Encounter   Procedures   • Diet Order     Standing Status:   Standing     Number of Occurrences:   1     Order Specific Question:   Diet:     Answer:   Regular [1]     Order Specific Question:   Diet:     Answer:   Full Liquid [11]     Order Specific Question:   Consistency/Fluid modifications:     Answer:   Nectar Thick [2]     Physical Exam   Constitutional: Vital signs are normal. He is cooperative. No distress.   Frail elderly   HENT:   Head: Normocephalic and atraumatic.   Right Ear: Hearing normal.   Left Ear: Hearing normal.   Nose: Nose normal.   Mouth/Throat: Mucous membranes are normal.   Eyes: EOM and lids are normal. Right eye exhibits no discharge. Left eye exhibits no discharge. No scleral icterus.   Neck: Phonation normal. No JVD present.   Cardiovascular: Normal  rate and regular rhythm.  Exam reveals no friction rub.    Murmur heard.  Pulmonary/Chest: Effort normal. No respiratory distress. He has no wheezes.   Diminished breath sounds in lung bases.    Abdominal: Soft. Bowel sounds are normal. He exhibits no distension. There is no tenderness.   Musculoskeletal: He exhibits no edema or tenderness.   Neurological: He is alert. He is disoriented.   Skin: Skin is warm and dry. He is not diaphoretic.   Psychiatric: His affect is blunt. He is withdrawn.   Nursing note and vitals reviewed.      Recent Labs      03/05/18   0455  03/06/18   0255   WBC  6.4  11.5*   RBC  4.12*  4.55*   HEMOGLOBIN  12.8*  13.9*   HEMATOCRIT  37.9*  41.9*   MCV  92.0  92.1   MCH  31.1  30.5   MCHC  33.8  33.2*   RDW  46.3  46.3   PLATELETCT  180  258   MPV  10.2  10.4     Recent Labs      03/05/18   0414  03/06/18   0255  03/07/18   0238   SODIUM  141  143  145   POTASSIUM  3.8  3.3*  3.7   CHLORIDE  112  109  113*   CO2  19*  25  22   GLUCOSE  138*  122*  133*   BUN  43*  41*  40*   CREATININE  1.55*  1.54*  1.53*   CALCIUM  9.0  9.1  9.1                      Assessment/Plan     Acute renal failure superimposed on stage 3 chronic kidney disease (CMS-HCC)- (present on admission)   Assessment & Plan    - renal function continue to improve even after stopping iv hydration.  -- renal ultrasound negative for hydronephrosis with parenchymal atrophy consistent with chronic kidney disease.  -- normal uric acid and ck level.  - avoid nephrotoxins  - renally dose medication as necessary          DNR (do not resuscitate)- (present on admission)   Assessment & Plan    - Patient speaks about his time [place] getting shorter   - Palliative care consulted        Lactic acidosis- (present on admission)   Assessment & Plan    Likely secondary to tissue hypoperfusion from dehydration. No signs of active infection. White count elevated slightly this morning. Could be secondary to atelectasis. Encourage incentive  spirometry. We'll check chest x-ray in the morning.          Aortic stenosis- (present on admission)   Assessment & Plan    - last ECHO on file here was June 2009: showed EF 65% with diastolic dysfunction and mild aortic stenosis and mild tricuspid regurg  - latest ECHO showed mild aortic stenosis with EF 60%              Depression   Assessment & Plan    Refusing any treatment.        Metabolic acidosis   Assessment & Plan    Due to lactic acidosis from dehydration and volume loss.        Dementia   Assessment & Plan    High risk for delirium. Avoid benzodiazepines or anticholinergic medications. Minimize lines. Avoid Shaw catheter. Daily orientation.        Gout- (present on admission)   Assessment & Plan    Continue Allopurinol        HTN (hypertension)- (present on admission)   Assessment & Plan    Optimize blood pressure according to the patient age as per AHA recommendations.        Hypothyroidism- (present on admission)   Assessment & Plan    - chronic on Levothyroxine. Continue        History of rectal cancer- (present on admission)   Assessment & Plan    - with presence of colostomy.  The patient's the patient is status post surgery 15 years ago and he has not been told to have recurrence of cancer in the last 15 years.  Stable        SSS (sick sinus syndrome) (CMS-HCC)- (present on admission)   Assessment & Plan    - history of  - presence of AICD/PPM        Esophageal reflux- (present on admission)   Assessment & Plan    Held PPI to limit risk of C. diff with antibiotic.  With discontinuation of antibiotic, will resume ppi.        Benign prostatic hypertrophy- (present on admission)   Assessment & Plan    Continue finasteride        Plan of care discussed with RN   Quality-Core Measures   DVT prophylaxis - mechanical:  SCDs

## 2018-03-07 NOTE — THERAPY
"Occupational Therapy Treatment completed with focus on ADLs, ADL transfers and patient education.  Functional Status:  Pt seen for OT tx today.  Pt was pleasantly confused during the session fatigues easily.  Continues to be limited by fatigue, weakness, endurance, and self care.  Pt completed LB dressing with min A due to fatigue and poor problem solving, UB dressing with min A, and seated gr/hy with min A.  Needing constant cues to initiate, follow through, and problem solve during ADL tasks.  Pt completed supine to sit, sit to stand, and stand pivot with CGA with cues to initiate.  Pt is continuing to need 24/7 close supervision for all ADLs.   Plan of Care: Will benefit from Occupational Therapy 3 times per week  Discharge Recommendations:  Equipment Will Continue to Assess for Equipment Needs.   See \"Rehab Therapy-Acute\" Patient Summary Report for complete documentation.   "

## 2018-03-07 NOTE — CARE PLAN
Problem: Safety  Goal: Will remain free from injury  Outcome: PROGRESSING AS EXPECTED  Bed low and locked. Call light in reach. Hourly rounding observed.     Problem: Skin Integrity  Goal: Risk for impaired skin integrity will decrease  Outcome: PROGRESSING AS EXPECTED

## 2018-03-07 NOTE — CARE PLAN
Problem: Nutritional:  Goal: Achieve adequate nutritional intake  Patient will consume 25-50% of meals   Outcome: PROGRESSING AS EXPECTED    Intervention: Monitor PO intake, weights, and laboratory values  PO intake has been variable since admit.   Pt has been eating from <% of full liquid meals.  Estimated needs for advanced age male are fairly low (9993-1298 kcal/day), overall pt likely meeting close to needs.  Will continue monitoring PO intake at this time.     RD following

## 2018-03-07 NOTE — PROGRESS NOTES
[facility]    Date of Service: 3/6/2018    Chief Complaint  89 y.o. male admitted 2018 with cough x 1 week and weakness.     Interval Problem Update  Patient is pleasantly demented. Disoriented to time. Caregiver at bedside. No new complaints to report.  Consultants/Specialty  None.    Disposition  SNF  Hospice     Review of Systems   Unable to perform ROS: Dementia      Physical Exam  Laboratory/Imaging   Hemodynamics  Temp (24hrs), Av.3 °C (97.4 °F), Min:36.1 °C (97 °F), Max:36.8 °C (98.2 °F)   Temperature: 36.1 °C (97 °F)  Pulse  Av  Min: 52  Max: 122    Blood Pressure : 154/89      Respiratory      Respiration: 19, Pulse Oximetry: 94 %     Work Of Breathing / Effort: Mild  RUL Breath Sounds: Rhonchi, RML Breath Sounds: Rhonchi, RLL Breath Sounds: Rhonchi, ETHAN Breath Sounds: Rhonchi, LLL Breath Sounds: Rhonchi    Fluids    Intake/Output Summary (Last 24 hours) at 18 1623  Last data filed at 18 0500   Gross per 24 hour   Intake                0 ml   Output              550 ml   Net             -550 ml       Nutrition  Orders Placed This Encounter   Procedures   • Diet Order     Standing Status:   Standing     Number of Occurrences:   1     Order Specific Question:   Diet:     Answer:   Regular [1]     Order Specific Question:   Diet:     Answer:   Full Liquid [11]     Order Specific Question:   Consistency/Fluid modifications:     Answer:   Nectar Thick [2]     Physical Exam   Constitutional: Vital signs are normal. He is cooperative. No distress.   Frail elderly   HENT:   Head: Normocephalic.   Right Ear: Hearing normal.   Left Ear: Hearing normal.   Nose: Nose normal.   Mouth/Throat: Mucous membranes are normal.   Eyes: EOM and lids are normal. No scleral icterus.   Neck: Phonation normal. No JVD present.   Cardiovascular: Normal rate and regular rhythm.    Murmur heard.  Pulmonary/Chest: Effort normal and breath sounds normal. No respiratory distress.   Diminished  breath sounds in lung bases.    Abdominal: Soft. He exhibits no distension. There is no tenderness.   Musculoskeletal: He exhibits no tenderness or deformity.   Neurological: He is alert.   Skin: Skin is warm and dry.   Psychiatric: His affect is blunt. He is withdrawn.   Nursing note and vitals reviewed.      Recent Labs      03/04/18 0225 03/05/18 0455  03/06/18   0255   WBC  10.0  6.4  11.5*   RBC  4.57*  4.12*  4.55*   HEMOGLOBIN  14.5  12.8*  13.9*   HEMATOCRIT  42.8  37.9*  41.9*   MCV  93.7  92.0  92.1   MCH  31.7  31.1  30.5   MCHC  33.9  33.8  33.2*   RDW  47.6  46.3  46.3   PLATELETCT  248  180  258   MPV  10.0  10.2  10.4     Recent Labs      03/04/18 0225 03/05/18   0414  03/06/18   0255   SODIUM  144  141  143   POTASSIUM  3.7  3.8  3.3*   CHLORIDE  113*  112  109   CO2  20  19*  25   GLUCOSE  131*  138*  122*   BUN  45*  43*  41*   CREATININE  1.63*  1.55*  1.54*   CALCIUM  9.6  9.0  9.1                      Assessment/Plan     Acute renal failure superimposed on stage 3 chronic kidney disease (CMS-[facility]CC)- (present on admission)   Assessment & Plan    - renal function continue to improve even after stopping iv hydration.  -- renal ultrasound negative for hydronephrosis.  -- normal uric acid and ck level.  - avoid nephrotoxins  - renally dose medication as necessary          DNR (do not resuscitate)- (present on admission)   Assessment & Plan    - Patient speaks about his time [place] getting shorter   - Palliative care consulted        Lactic acidosis- (present on admission)   Assessment & Plan    Likely secondary to tissue hypoperfusion from dehydration. No signs of active infection.          Aortic stenosis- (present on admission)   Assessment & Plan    - last ECHO on file here was June 2009: showed EF 65% with diastolic dysfunction and mild aortic stenosis and mild tricuspid regurg  - latest ECHO showed mild aortic stenosis with EF 60%              Depression   Assessment & Plan    Refusing  any treatment.        Metabolic acidosis   Assessment & Plan    Due to lactic acidosis from dehydration and volume loss.        Dementia   Assessment & Plan    High risk for delirium. Avoid benzodiazepines or anticholinergic medications. Minimize lines. Avoid Shaw catheter. Daily orientation.        Gout- (present on admission)   Assessment & Plan    Allopurinol        HTN (hypertension)- (present on admission)   Assessment & Plan    Hold Losartan and HCTZ   resume as clinically appropriate  BP is high today.  Continue hydralazine and increase carvedilol to better control bp.        Hypothyroidism- (present on admission)   Assessment & Plan    - chronic on Levothyroxine        History of rectal cancer- (present on admission)   Assessment & Plan    - with presence of colostomy.  The patient's the patient is status post surgery 15 years ago and he has not been told to have recurrence of cancer in the last 15 years.        SSS (sick sinus syndrome) (CMS-HCC)- (present on admission)   Assessment & Plan    - history of  - presence of AICD/PPM        Esophageal reflux- (present on admission)   Assessment & Plan    Held PPI to limit risk of C. diff with antibiotic.  With discontinuation of antibiotic, will resume ppi.        Benign prostatic hypertrophy- (present on admission)   Assessment & Plan    Continue finasteride        Plan of care discussed with RN   Quality-Core Measures   DVT prophylaxis - mechanical:  SCDs

## 2018-03-07 NOTE — THERAPY
"Speech Language Therapy dysphagia treatment completed.   Functional Status:  Fxnl swallow for a modified diet.  Pt needs coke to be thickened to nectar thick; needs assistance and verbal cueing to eat meal; poor appetite, although pt presenting with improving alertness and attn to task vs when seen 3/5/18  Recommendations: Continue caloric dense sm meals and snacks with supplements per RD.     Plan of Care: Will benefit from Speech Therapy 3 times per week  Post-Acute Therapy: Discharge to a transitional care facility for continued skilled therapy services.    See \"Rehab Therapy-Acute\" Patient Summary Report for complete documentation.     "

## 2018-03-07 NOTE — DISCHARGE PLANNING
Medical Social Worker    MATEO spoke to pt's son over the phone. He stated that he was upset because he received conflicting information from the palliative RN and attending DO (Jon). He stated that the palliative RN was recommending hospice and Dr. Webb was recommending SNF with therapies. SW explained that he should consider what his father would want for himself and that he has multiple DC options. SW requested a care conference with son and current attending MD so that clarification could be provided to son so that son would feel more comfortable making a decision. Son requested that PT see pt again as within two days pt went from min/stand by assist to total assist/unable to participate. Son stated that he feels this drastic change is due to depression and feels that pt may need Psych meds.    MATEO stated to pt's son that this SW will reach out to attending MD to schedule this and get back to pt's son with a time for today. Pt's son stated he is free until 1630 today.     Add:  MATEO spoke to attending MD about pt's son's concerns. He stated he would be happy to meet with him at 1430. MATEO updated pt's son, who stated that 1430 works for him. He stated to SW that he would only like MATEO Echevarria and the attending MD in this care conference.

## 2018-03-07 NOTE — THERAPY
"Pt w/improved balance, gait, and activity toelrance compared to prev tx. Pt more altert this tx and able to initate movements.  Pt demonstrated B knees flexed and forward flexed posture while amb, requiring verbal cuing. Pt w/limited trunk mobility w/reaching activities, requiring verbal cuing. Pt would benefit from further acute PT txs to progress towards goals and indepdendence. would recommend post acute placement to address deficits.     Physical Therapy Treatment completed.   Bed Mobility:  Supine to Sit: Minimal Assist  Transfers: Sit to Stand: Contact Guard Assist  Gait: Level Of Assist: Contact Guard Assist with Front-Wheel Walker       Plan of Care: Will benefit from Physical Therapy 3 times per week    See \"Rehab Therapy-Acute\" Patient Summary Report for complete documentation.       "

## 2018-03-08 VITALS
WEIGHT: 150.35 LBS | TEMPERATURE: 97.3 F | HEART RATE: 61 BPM | OXYGEN SATURATION: 94 % | RESPIRATION RATE: 16 BRPM | HEIGHT: 65 IN | BODY MASS INDEX: 25.05 KG/M2 | DIASTOLIC BLOOD PRESSURE: 89 MMHG | SYSTOLIC BLOOD PRESSURE: 139 MMHG

## 2018-03-08 LAB
ALBUMIN SERPL BCP-MCNC: 3.2 G/DL (ref 3.2–4.9)
ALBUMIN/GLOB SERPL: 1.2 G/DL
ALP SERPL-CCNC: 35 U/L (ref 30–99)
ALT SERPL-CCNC: 6 U/L (ref 2–50)
ANION GAP SERPL CALC-SCNC: 9 MMOL/L (ref 0–11.9)
AST SERPL-CCNC: 15 U/L (ref 12–45)
BILIRUB SERPL-MCNC: 0.8 MG/DL (ref 0.1–1.5)
BUN SERPL-MCNC: 34 MG/DL (ref 8–22)
CALCIUM SERPL-MCNC: 9.1 MG/DL (ref 8.5–10.5)
CHLORIDE SERPL-SCNC: 111 MMOL/L (ref 96–112)
CO2 SERPL-SCNC: 24 MMOL/L (ref 20–33)
CREAT SERPL-MCNC: 1.5 MG/DL (ref 0.5–1.4)
GLOBULIN SER CALC-MCNC: 2.6 G/DL (ref 1.9–3.5)
GLUCOSE SERPL-MCNC: 152 MG/DL (ref 65–99)
POTASSIUM SERPL-SCNC: 3.4 MMOL/L (ref 3.6–5.5)
PROT SERPL-MCNC: 5.8 G/DL (ref 6–8.2)
SODIUM SERPL-SCNC: 144 MMOL/L (ref 135–145)

## 2018-03-08 PROCEDURE — 99239 HOSP IP/OBS DSCHRG MGMT >30: CPT | Performed by: FAMILY MEDICINE

## 2018-03-08 PROCEDURE — 302111 WAFER OST 2.25IN N IMG RD 2 PC (BARRIER): Performed by: FAMILY MEDICINE

## 2018-03-08 PROCEDURE — 700102 HCHG RX REV CODE 250 W/ 637 OVERRIDE(OP): Performed by: FAMILY MEDICINE

## 2018-03-08 PROCEDURE — 700102 HCHG RX REV CODE 250 W/ 637 OVERRIDE(OP): Performed by: INTERNAL MEDICINE

## 2018-03-08 PROCEDURE — 700111 HCHG RX REV CODE 636 W/ 250 OVERRIDE (IP): Performed by: INTERNAL MEDICINE

## 2018-03-08 PROCEDURE — A9270 NON-COVERED ITEM OR SERVICE: HCPCS | Performed by: INTERNAL MEDICINE

## 2018-03-08 PROCEDURE — 36415 COLL VENOUS BLD VENIPUNCTURE: CPT

## 2018-03-08 PROCEDURE — 92526 ORAL FUNCTION THERAPY: CPT

## 2018-03-08 PROCEDURE — 80053 COMPREHEN METABOLIC PANEL: CPT

## 2018-03-08 RX ORDER — GUAIFENESIN/DEXTROMETHORPHAN 100-10MG/5
15 SYRUP ORAL EVERY 6 HOURS PRN
Status: DISCONTINUED | OUTPATIENT
Start: 2018-03-08 | End: 2018-03-08 | Stop reason: HOSPADM

## 2018-03-08 RX ORDER — POTASSIUM CHLORIDE 20 MEQ/1
20 TABLET, EXTENDED RELEASE ORAL ONCE
Status: DISCONTINUED | OUTPATIENT
Start: 2018-03-08 | End: 2018-03-08 | Stop reason: HOSPADM

## 2018-03-08 RX ADMIN — LABETALOL HYDROCHLORIDE 10 MG: 5 INJECTION, SOLUTION INTRAVENOUS at 06:38

## 2018-03-08 RX ADMIN — GUAIFENESIN AND DEXTROMETHORPHAN 15 ML: 100; 10 SYRUP ORAL at 02:33

## 2018-03-08 RX ADMIN — HYDRALAZINE HYDROCHLORIDE 50 MG: 25 TABLET, FILM COATED ORAL at 05:14

## 2018-03-08 RX ADMIN — HYDRALAZINE HYDROCHLORIDE 50 MG: 25 TABLET, FILM COATED ORAL at 15:23

## 2018-03-08 RX ADMIN — FLUTICASONE PROPIONATE 100 MCG: 50 SPRAY, METERED NASAL at 07:40

## 2018-03-08 RX ADMIN — LEVOTHYROXINE SODIUM 150 MCG: 150 TABLET ORAL at 05:14

## 2018-03-08 ASSESSMENT — PAIN SCALES - GENERAL
PAINLEVEL_OUTOF10: 0
PAINLEVEL_OUTOF10: 0

## 2018-03-08 NOTE — PROGRESS NOTES
Pt pleasantly confused, denies pain. Congested cough noted, encouraging pt to TCDB and use IS, reaches 250-500 ml on IS, needs frequent reminders to continue to use 10x hour while awake. Pt tolerating diet, pills crushed in applesauce, incontinent of urine, ostomy red with soft/loose brown BM, pouch changed x2, and wafer changed due to leaking.

## 2018-03-08 NOTE — CARE PLAN
Problem: Respiratory:  Goal: Respiratory status will improve  Outcome: PROGRESSING AS EXPECTED  Encouraging TCDB and use of IS 10x hr while awake   Pt needs assistance with using IS   Reaches between 250-500 ml     Problem: Skin Integrity  Goal: Risk for impaired skin integrity will decrease  Outcome: PROGRESSING AS EXPECTED  Encouraging mobilization and turning every 2 hours. Educated about preventing skin break down and pt verbalizes understanding. Repositioned with pillows and draw sheet every 2 hours. Barrier wipes, cream, and lotion in use. Heels floated with pillows.

## 2018-03-08 NOTE — CARE PLAN
Problem: Safety  Goal: Will remain free from falls  Outcome: PROGRESSING AS EXPECTED      Problem: Knowledge Deficit  Goal: Knowledge of disease process/condition, treatment plan, diagnostic tests, and medications will improve  Outcome: PROGRESSING SLOWER THAN EXPECTED

## 2018-03-08 NOTE — PROGRESS NOTES
Pt continues to have congested cough. Telephone order with readback for 15 ml Robitussin DM 15ml q6 PRN for cough per Dr. Mcdonough.

## 2018-03-08 NOTE — PROGRESS NOTES
Assumed care of patient at 0700.  Report received at bedside.  Patient is confused, refuses to take any medications.  Ambulated to bathroom with 1-assist and FWW.  Refused breakfast, but did drink a thickened Coke with milk and ice cream.  Caregiver at bedside.  Hourly rounding in place.

## 2018-03-08 NOTE — DISCHARGE INSTRUCTIONS
Discharge Instructions    Discharged to other by medical transportation with escort. Discharged via wheelchair, hospital escort: Refused.  Special equipment needed: Not Applicable    Be sure to schedule a follow-up appointment with your primary care doctor or any specialists as instructed.     Discharge Plan:   Diet Plan: Discussed  Activity Level: Discussed  Confirmed Follow up Appointment: Patient to Call and Schedule Appointment  Confirmed Symptoms Management: Discussed  Medication Reconciliation Updated: Yes  Influenza Vaccine Indication: Patient Refuses    I understand that a diet low in cholesterol, fat, and sodium is recommended for good health. Unless I have been given specific instructions below for another diet, I accept this instruction as my diet prescription.   Other diet: Nectar Thick/Full Liquids    Special Instructions: None    · Is patient discharged on Warfarin / Coumadin?   No     Depression / Suicide Risk    As you are discharged from this [facility] facility, it is important to learn how to keep safe from harming yourself.    Recognize the warning signs:  · Abrupt changes in personality, positive or negative- including increase in energy   · Giving away possessions  · Change in eating patterns- significant weight changes-  positive or negative  · Change in sleeping patterns- unable to sleep or sleeping all the time   · Unwillingness or inability to communicate  · Depression  · Unusual sadness, discouragement and loneliness  · Talk of wanting to die  · Neglect of personal appearance   · Rebelliousness- reckless behavior  · Withdrawal from people/activities they love  · Confusion- inability to concentrate     If you or a loved one observes any of these behaviors or has concerns about self-harm, here's what you can do:  · Talk about it- your feelings and reasons for harming yourself  · Remove any means that you might use to hurt yourself (examples: pills, rope, extension cords, firearm)  · Get  professional help from the community (Mental Health, Substance Abuse, psychological counseling)  · Do not be alone:Call your Safe Contact- someone whom you trust who will be there for you.  · Call your local CRISIS HOTLINE 734-4587 or 410-454-7643  · Call your local Children's Mobile Crisis Response Team [place] (380) 955-0502 or www.[employer]  · Call the toll free National Suicide Prevention Hotlines   · National Suicide Prevention Lifeline 861-663-VRDK (2803)  · National Hope Line Network 800-SUICIDE (514-6630)

## 2018-03-08 NOTE — PROGRESS NOTES
Patient discharged to [facility] with  [employer] employee, Luiz.  Transfer sheet on chart.

## 2018-03-08 NOTE — DISCHARGE PLANNING
Received transport form form [facility](MATEO). Arranged patient's transport to [place] at 1500 via [facility]. [facility]) notified of transport time.

## 2018-03-08 NOTE — DISCHARGE SUMMARY
CHIEF COMPLAINT ON ADMISSION  Chief Complaint   Patient presents with   • Cold Symptoms     cough worsening x 1 week, increasing weakness       CODE STATUS  DNR    HPI & HOSPITAL COURSE  This is a 89 y.o. Male with past medical history of Alzheimer's dementia, aortic stenosis, Graves' disease, chronic kidney disease who came in with flulike symptoms and generalized weakness. Was also found to be in acute respiratory failure with no clear etiology other than bronchitis or reactive airway disease. Patient was started on antibiotics. He was also found to have acute on chronic kidney failure. Initially was started on IV fluids and his kidney functions gradually improved and fluids were stopped. Patient initially was having hypoactive delirium and was lethargic most of the time. He was dehydrated and has been metabolic lactic acidosis secondary to tissue hypoperfusion. He was nonseptic or in shock. Initially there was a discussion regarding hospice placement with the son. However, the patient improved gradually over hospital course and that was reversed. Patient mental status started to improve over time with correction of his aforementioned underlying conditions. He was hemodynamically and clinically stable. His kidney functions returned to baseline. His infection was treated with a course of antibiotics. His respiratory status improved and he was in weaned off oxygen and was saturating well on room air. Patient was assessed for placement in SNF. Patient was accepted and discharged in stable condition.    The patient met 2-midnight criteria for an inpatient stay at the time of discharge.    Therefore, he is discharged in good and stable condition with close outpatient follow-up.    SPECIFIC OUTPATIENT FOLLOW-UP  PCP as per facility recommendations.    DISCHARGE PROBLEM LIST  Active Problems:    Acute renal failure superimposed on stage 3 chronic kidney disease (CMS-HCC) POA: Yes    Aortic stenosis POA: Yes    Lactic  acidosis POA: Yes    DNR (do not resuscitate) POA: Yes    Benign prostatic hypertrophy POA: Yes    Esophageal reflux POA: Yes    SSS (sick sinus syndrome) (CMS-HCC) POA: Yes    History of rectal cancer (Chronic) POA: Yes    Hypothyroidism (Chronic) POA: Yes    HTN (hypertension) POA: Yes    Gout (Chronic) POA: Yes    Dementia POA: Unknown    Metabolic acidosis POA: Unknown    Depression POA: Unknown  Resolved Problems:    Acute hypoxemic respiratory failure (CMS-[facility]) POA: Yes    Reactive airway disease POA: Yes    Leukocytosis POA: Yes    Acute bronchitis POA: Yes    Shortness of breath POA: Unknown      FOLLOW UP  Future Appointments  Date Time Provider Department Center   7/25/2018 10:30 AM PACER CHECK-[facility] None   7/25/2018 10:45 AM Kathi Bush M.D. [facility] None     Sandra Kwon M.D.  [address] [place] [id number]  [phone]    In 1 week      [facility]  [address] [facility] [address]  [phone]          MEDICATIONS ON DISCHARGE   Richard Callahanbritt   Home Medication Instructions CALOS:[id number]    Printed on:03/08/18 1401   Medication Information                      acetaminophen (TYLENOL) 325 MG Tab  Take 2 Tabs by mouth every 6 hours as needed (Mild Pain; (Pain scale 1-3); Temp greater than 100.5 F).             allopurinol (ZYLOPRIM) 100 MG Tab  Take 1 Tab by mouth every day.             aspirin (ASA) 81 MG Chew Tab chewable tablet  Take 1 Tab by mouth every day.             carvedilol (COREG) 3.125 MG Tab  Take 2 Tabs by mouth 2 times a day, with meals.             finasteride (PROSCAR) 5 MG Tab  Take 1 Tab by mouth every day.             furosemide (LASIX) 10 MG/ML Solution  2 mL by Intravenous route every day for 3 days.             heparin 5000 UNIT/ML Solution  Inject 1 mL as instructed every 12 hours.             hydrALAZINE (APRESOLINE) 50 MG Tab  Take 1 Tab by mouth every 8 hours.             ipratropium-albuterol (DUONEB) 0.5-2.5 (3) MG/3ML  nebulizer solution  3 mL by Nebulization route every 6 hours as needed for Shortness of Breath.             levothyroxine (SYNTHROID) 150 MCG Tab  Take 1 Tab by mouth Every morning on an empty stomach.             omeprazole (PRILOSEC) 20 MG delayed-release capsule  Take 1 Cap by mouth every day.             vitamin D, Ergocalciferol, (DRISDOL) 52517 units Cap capsule  Take 1 Cap by mouth every Monday.                 DIET  Orders Placed This Encounter   Procedures   • Diet Order     Standing Status:   Standing     Number of Occurrences:   1     Order Specific Question:   Diet:     Answer:   Regular [1]     Order Specific Question:   Diet:     Answer:   Full Liquid [11]     Order Specific Question:   Consistency/Fluid modifications:     Answer:   Nectar Thick [2]       ACTIVITY  As tolerated.  Weight bearing as tolerated      CONSULTATIONS  None    PROCEDURES  None    LABORATORY  Lab Results   Component Value Date/Time    SODIUM 144 03/08/2018 02:47 AM    POTASSIUM 3.4 (L) 03/08/2018 02:47 AM    CHLORIDE 111 03/08/2018 02:47 AM    CO2 24 03/08/2018 02:47 AM    GLUCOSE 152 (H) 03/08/2018 02:47 AM    BUN 34 (H) 03/08/2018 02:47 AM    CREATININE 1.50 (H) 03/08/2018 02:47 AM    CREATININE 1.0 09/04/2008 09:30 AM        Lab Results   Component Value Date/Time    WBC 11.5 (H) 03/06/2018 02:55 AM    HEMOGLOBIN 13.9 (L) 03/06/2018 02:55 AM    HEMATOCRIT 41.9 (L) 03/06/2018 02:55 AM    PLATELETCT 258 03/06/2018 02:55 AM        Total time of the discharge process exceeds 36 minutes

## 2018-03-08 NOTE — THERAPY
"Speech Language Therapy dysphagia treatment completed.   Functional Status:  Pt continues to demo improving strength and toleration of modified diet due to observed aspiration of thin liquids during fees.  HOWEVER, pt with poor intake; needs sm caloric-dense meals and snacks; was open to having his coke made into a 'coke milkshake' with ice cream and milk to boost intake.  Recommendations: Continue out of bed for all meals, continue ntfl diet with caloric dense meals and snacks and supplements per RD please.  Plan of Care: Will benefit from Speech Therapy 3 times per week  Post-Acute Therapy: Discharge to a transitional care facility for continued skilled therapy services; pt improving over time during this week.  See \"Rehab Therapy-Acute\" Patient Summary Report for complete documentation.     "

## 2018-03-08 NOTE — DISCHARGE PLANNING
Medical Social Worker    [facility]O was informed by [facility] that updated PT note was faxed to [place].

## 2018-03-08 NOTE — DISCHARGE PLANNING
Medical Social Worker    MATEO informed pt, pt's son, and charge/bedside RN, that pt is to DC to [place] today at 1500 via [employer] [facility].     Add:  MATEO handed DC Pack to bedside RN.

## 2018-03-08 NOTE — DISCHARGE PLANNING
Medical Social Worker    MATEO was informed by [facility] that pt can be accepted by [facility] today. MATEO faxed transport form to [facility].

## 2018-11-27 NOTE — ED NOTES
Pt and family updated on POC. Aware of plan for admit   Pt seen by neuro Dr. Ochoa, Discussed all of above with Dr. Ochoa and MRI ordered.

## 2020-02-16 NOTE — PALLIATIVE CARE
"Palliative Care follow-up  Collaboration with BS RN.  Patient sitting up at bedside. Lethargic.  No family at bedside, [employer] at the patient's side.  RN reports that the patient continues to make statements that \"this is his end\".  PC to Richard rodarte, to review patient's goals of care.  No answer, message requesting a return call.    14:00Return call from Richard rodarte.  Discussed discharge to SNF,  Discharge with Hospice at home vs SNF. Plan to meet 3/5/18 unknown time.  Richard will call and leave me a message stating time.  K    Updated: BS RN    Plan: SNF placement pending.      Thank you for allowing Palliative Care to participate in this patient's care. Please feel free to call [phone] with any questions or concerns.  "
"Palliative Care follow-up  Met with son, Richard, at bedside.  Richard presented the patient's past and presented medical status as he understands it to be.  Richard is still questioning \"why this is happening\" and wants confirmation of/if the patient is aspirating before deciding on direction of care.   MATEO Echevarria joined the meeting- patient has been accepted to [place] for SNF days.  Richard continues to struggle with the thought of EOL for his father.  He stated that he watched his mother \"go through years of this and it was miserable\" (for him) \"and I'm sure for her too\".  I presented Hospice Services & Philosophy.  We discussed a more compassionate level of care, Hospice vs [place] for SNF days.    All questions answered in full, Richard will wait for ST and lunch to determine if the patient is aspirating or not, then he will decide on Hospice vs SNF.    1400- Went back to place new POLST on the chart.  Richard remained at bedside, no lunch food open, patient sleeping soundly.  Richard states \"this is how he's been since I got here\".  I questioned if he is responding to him and/or does he know that he (Richard) is at bedside.  He stated \"yes\".  I questioned if he had asked the patient what he wants to do.  He \"know he would want to go home\".  We discussed ongoing process of dying.  Patient sleeps more than awake, decreased to no appetite, no fluids.    Richard stated that he wants to take the patient to his home with Hospice.  Choice was offered, Hospice list was provided.   Plan: Home with Hospice when plan in place and patient medically cleared.  Updated: [facility] GALINDO, Sandra Martin, RN  Thank you for allowing Palliative Care to participate in this patient's care. Please feel free to call [phone] with any questions or concerns.  "
Li Palacio, .:   GENERAL: Patient awake alert NAD.  HEENT: NC/AT, Moist mucous membranes, PERRL, EOMI. Erythematous posterior oropharynx without exudate. Minimal ant cervical adenopathy.   LUNGS: CTAB, no wheezes or crackles.   CARDIAC: RRR, no m/r/g.    ABDOMEN: Soft, NT, ND, No rebound, guarding. No CVA tenderness.   EXT: No edema. No calf tenderness.  MSK: No spinal tenderness, no pain with movement, no deformities.  NEURO: A&Ox3. Moving all extremities.  SKIN: Warm and dry. No rash.  PSYCH: Normal affect.

## 2021-01-20 DIAGNOSIS — Z23 NEED FOR VACCINATION: ICD-10-CM
